# Patient Record
Sex: FEMALE | Race: BLACK OR AFRICAN AMERICAN | NOT HISPANIC OR LATINO | Employment: FULL TIME | URBAN - METROPOLITAN AREA
[De-identification: names, ages, dates, MRNs, and addresses within clinical notes are randomized per-mention and may not be internally consistent; named-entity substitution may affect disease eponyms.]

---

## 2021-08-19 PROBLEM — M54.9 BACK PAIN: Status: ACTIVE | Noted: 2021-08-19

## 2021-08-19 PROBLEM — K59.00 CONSTIPATION: Status: ACTIVE | Noted: 2021-08-19

## 2021-08-19 PROBLEM — M25.559 HIP PAIN: Status: ACTIVE | Noted: 2021-08-19

## 2021-08-19 PROBLEM — R20.2 NUMBNESS AND TINGLING: Status: ACTIVE | Noted: 2021-08-19

## 2021-08-19 PROBLEM — M25.519 SHOULDER PAIN: Status: ACTIVE | Noted: 2021-08-19

## 2021-08-19 PROBLEM — G56.00 CARPAL TUNNEL SYNDROME: Status: ACTIVE | Noted: 2021-08-19

## 2021-08-19 PROBLEM — D64.9 ANEMIA: Status: ACTIVE | Noted: 2021-08-19

## 2021-08-19 PROBLEM — R20.0 NUMBNESS AND TINGLING: Status: ACTIVE | Noted: 2021-08-19

## 2021-08-20 ENCOUNTER — OFFICE VISIT (OUTPATIENT)
Dept: BARIATRICS/WEIGHT MGMT | Facility: CLINIC | Age: 41
End: 2021-08-20

## 2021-08-20 VITALS
DIASTOLIC BLOOD PRESSURE: 98 MMHG | HEIGHT: 63 IN | WEIGHT: 262.4 LBS | HEART RATE: 77 BPM | BODY MASS INDEX: 46.49 KG/M2 | TEMPERATURE: 97.8 F | SYSTOLIC BLOOD PRESSURE: 142 MMHG

## 2021-08-20 DIAGNOSIS — Z98.84 S/P LAPAROSCOPIC SLEEVE GASTRECTOMY: ICD-10-CM

## 2021-08-20 DIAGNOSIS — E66.01 OBESITY, CLASS III, BMI 40-49.9 (MORBID OBESITY) (HCC): Primary | ICD-10-CM

## 2021-08-20 DIAGNOSIS — D50.8 OTHER IRON DEFICIENCY ANEMIA: ICD-10-CM

## 2021-08-20 PROCEDURE — 99203 OFFICE O/P NEW LOW 30 MIN: CPT | Performed by: SURGERY

## 2021-08-20 NOTE — PROGRESS NOTES
[facility] BARIATRIC [facility] BARIATRIC SURGERY  [address] [facility] [address]  [facility] [address]  [phone]  [address] [facility] [address]  [facility] [address]  [phone]  Dept: [phone]  8/20/2021      Sravanthi Kauffman.  [id number]  [id number]  1980  female      Chief Complaint   Patient presents with   • Consult     PORFIRIO SLEEVE / Discuss revision       BH Post-Op Bariatric Surgery:   Sravanthi Kauffman is status post Laparoscopic Sleeve procedure, performed on 2/14/12 at [facility].    HPI:   Today's weight is 119 kg (262 lb 6.4 oz) pounds, today's BMI is Body mass index is 46.2 kg/m².. @ greatest weight loss from surgery was 80 pounds. The patient reports an unwanted weight gain of 20 pounds.  [unfilled] denies fever, chills, chest pain, SOA, melena, hematochezia, hematemesis, dysuria, frequency, hematuria, jaundice.    41-year-old female status post laparoscopic sleeve gastrectomy 2012 in [facility] who moved to [place].  Patient states that she would like to lose more weight and feels like conversion to gastric bypass are possible modified duodenal switch procedure would be best choice.  She went through her daily routine and eating habits.      Diet and Exercise:   Diet history reviewed and discussed with the patient. Weight loss/gains to date discussed with the patient. She reports eating 3 meals per day, a typical portion size of 1 cup, eating 1 snacks per day, drinking 4 or more 8-oz. glasses of water per day, no carbonated beverage consumption and exercising regularly.     Supplements: Iron    Review of Systems   Constitutional: Positive for fatigue.   Gastrointestinal: Positive for constipation.   Musculoskeletal: Positive for arthralgias.   All other systems reviewed and are negative.      Patient Active Problem List   Diagnosis   • Constipation   • Shoulder pain   • Hip pain   • Back pain   • Carpal tunnel syndrome   • Numbness and  tingling   • Anemia   • Obesity, Class III, BMI 40-49.9 (morbid obesity) (CMS/[facility])   • S/P laparoscopic sleeve gastrectomy       History reviewed. No pertinent past medical history.    Past Surgical History:   Procedure Laterality Date   •  SECTION     • GASTRIC SLEEVE LAPAROSCOPIC         Allergies   Allergen Reactions   • Latex Itching       No current outpatient medications on file.    Social History     Socioeconomic History   • Marital status: Single     Spouse name: Not on file   • Number of children: Not on file   • Years of education: Not on file   • Highest education level: Not on file   Tobacco Use   • Smoking status: Never Smoker   • Smokeless tobacco: Never Used   Substance and Sexual Activity   • Alcohol use: Never   • Drug use: Never       Family History   Problem Relation Age of Onset   • Obesity Mother    • Hypertension Mother    • Obesity Father    • Hypertension Father    • Obesity Brother    • Sleep apnea Brother    • Obesity Maternal Grandmother    • Stroke Maternal Grandmother    • Cancer Maternal Grandfather        The following portions of the patient's history were reviewed and updated as appropriate: allergies, current medications, past family history, past medical history, past social history, past surgical history and problem list.    Vitals:    21 1013   BP: 142/98   Pulse: 77   Temp: 97.8 °F (36.6 °C)       Physical Exam  Vitals reviewed.   HENT:      Head: Normocephalic and atraumatic.      Mouth/Throat:      Mouth: Mucous membranes are moist.      Pharynx: Oropharynx is clear.   Eyes:      General: No scleral icterus.     Extraocular Movements: Extraocular movements intact.      Conjunctiva/sclera: Conjunctivae normal.      Pupils: Pupils are equal, round, and reactive to light.   Neck:      Thyroid: No thyromegaly.   Cardiovascular:      Rate and Rhythm: Normal rate.   Pulmonary:      Effort: Pulmonary effort is normal. No respiratory distress.      Breath sounds: Normal  breath sounds. No stridor. No wheezing or rhonchi.   Abdominal:      General: Bowel sounds are normal.      Palpations: Abdomen is soft.      Tenderness: There is no abdominal tenderness. There is no right CVA tenderness, left CVA tenderness, guarding or rebound.      Hernia: No hernia is present.   Musculoskeletal:         General: Normal range of motion.      Cervical back: Normal range of motion and neck supple.   Lymphadenopathy:      Cervical: No cervical adenopathy.   Skin:     General: Skin is warm and dry.      Findings: No erythema.   Neurological:      Mental Status: She is alert and oriented to person, place, and time.   Psychiatric:         Mood and Affect: Mood normal.         Behavior: Behavior normal.         Thought Content: Thought content normal.         Judgment: Judgment normal.           Assessment:   Sravanthi Kauffman has severe obesity with multiple co-morbidities who would like to transfer her bariatric care to us and participate in our bariatric program.      Encounter Diagnoses   Name Primary?   • Obesity, Class III, BMI 40-49.9 (morbid obesity) (CMS/[facility]) Yes   • S/P laparoscopic sleeve gastrectomy    • Other iron deficiency anemia          Discussion/Summary/Plan:     41-year-old female status post laparoscopic sleeve gastrectomy 2012 at [facility] [place] who moved to [place] and is interested in conversion to Divya-en-Y gastric bypass for possible modified duodenal switch.  I went through all the different conversion procedures and all questions answered.  Also discussed clean eating and an exercise routine    Recommended patient be sure to eat at least three meals per day all with high lean protein, vegetables and fruit. Be sure to limit/cut back on daily simple carbohydrate intake. Discussed with the patient the recommended amount of water per day to intake. Reviewed vitamin requirements. Be sure to do routine exercise including both cardio and strength  training. Recommended patient to see our dietician to go into more detail regarding diet changes.    Instructions / Recommendations: dietary counseling recommended, recommended a daily protein intake of  grams, vitamin supplements recommended, recommended exercising at least 150 minutes per week, behavior modifications recommended and instructed to call the office for concerns, questions, or problems.    The patient was instructed to follow up in intake appointment.     The patient was counseled regarding diet, exercise and the surgical procedures available. Our bariatric manual was given to the patient and was discussed in detail. Dietician appointment was highly recommended as well as attending support groups.  Total time of encounter was over 35 minutes counseling the patient and going over the procedure.  Dietary changes as well as exercise were also discussed.  Time was also spent before the encounter to review their history and any documentation provided..

## 2021-09-17 ENCOUNTER — OFFICE VISIT (OUTPATIENT)
Dept: BARIATRICS/WEIGHT MGMT | Facility: CLINIC | Age: 41
End: 2021-09-17

## 2021-09-17 VITALS
RESPIRATION RATE: 18 BRPM | BODY MASS INDEX: 46.6 KG/M2 | WEIGHT: 263 LBS | HEIGHT: 63 IN | HEART RATE: 71 BPM | TEMPERATURE: 97.7 F | DIASTOLIC BLOOD PRESSURE: 94 MMHG | SYSTOLIC BLOOD PRESSURE: 133 MMHG

## 2021-09-17 DIAGNOSIS — D50.8 OTHER IRON DEFICIENCY ANEMIA: ICD-10-CM

## 2021-09-17 DIAGNOSIS — Z98.84 S/P LAPAROSCOPIC SLEEVE GASTRECTOMY: ICD-10-CM

## 2021-09-17 DIAGNOSIS — E66.01 OBESITY, CLASS III, BMI 40-49.9 (MORBID OBESITY) (HCC): Primary | ICD-10-CM

## 2021-09-17 DIAGNOSIS — K59.00 CONSTIPATION, UNSPECIFIED CONSTIPATION TYPE: ICD-10-CM

## 2021-09-17 PROCEDURE — 99214 OFFICE O/P EST MOD 30 MIN: CPT | Performed by: NURSE PRACTITIONER

## 2021-09-17 RX ORDER — FERROUS SULFATE 325(65) MG
325 TABLET ORAL
Qty: 30 TABLET | Refills: 2 | Status: SHIPPED | OUTPATIENT
Start: 2021-09-17 | End: 2021-12-16

## 2021-09-17 RX ORDER — TOPIRAMATE 25 MG/1
25 TABLET ORAL NIGHTLY
Qty: 30 TABLET | Refills: 0 | Status: SHIPPED | OUTPATIENT
Start: 2021-09-17 | End: 2021-10-19

## 2021-09-17 NOTE — PROGRESS NOTES
[facility] BARIATRIC [facility] BARIATRIC SURGERY  [address] [facility] [address]  [facility] [address]  [phone]  [address] [id number] [phone]  Dept: [phone]  9/17/2021      Sravanthi Kauffman.  [id number]  [id number]  1980  female      Chief Complaint   Patient presents with   • Consult     diet 2/3       The patient is here for month 2/3 of their pre-operative physician supervised diet. Today's weight is 119 kg (263 lb) pounds and had a loss of 1 lbs. The patient states that she is following the recommendations given by our office and dietician including a high lean protein, low carb and low fat diet. We recommended adequate fruits and vegetable intake along with limited portion sizes. Patient is working on eliminating fast foods, fried foods, sweets and soda. Sravanthi Kauffman has been increasing her daily water intake. She has been exercising: she has a very active job at [employer]. She has been struggling with back and knee pain. She used to walk 7 miles per day but hasn't been able to do so due to her back and knee pain.    Patient states they have made positive changes including getting 1 meal per day. She works at [employer] and has a pretty busy schedule. She doesn't usually eat at work. She works 8-10 hours. She usually wakes up by lunch time and eats then, she will have a snack in the evenings. When she wakes up she may do salad with grilled chicken or may do fast foods at time. She may grab an apple be before bed. She will usually have leftovers in the evening before bed.   The patient admits to be struggling with knee pain, back pain, sleep disturbance, and getting used to shift work.     Review of Systems   Constitutional: Positive for appetite change. Negative for fatigue and unexpected weight change.   HENT: Negative.    Eyes: Negative.    Respiratory: Negative.    Cardiovascular: Negative.  Negative for leg swelling.   Gastrointestinal: Negative for  abdominal distention, abdominal pain, constipation, diarrhea, nausea and vomiting.   Genitourinary: Negative for difficulty urinating, frequency and urgency.   Musculoskeletal: Negative for back pain.   Skin: Negative.    Psychiatric/Behavioral: Positive for sleep disturbance.   All other systems reviewed and are negative.    Vitals:    09/17/21 0829   BP: 133/94   Pulse: 71   Resp: 18   Temp: 97.7 °F (36.5 °C)     Patient Active Problem List   Diagnosis   • Constipation   • Shoulder pain   • Hip pain   • Back pain   • Carpal tunnel syndrome   • Numbness and tingling   • Anemia   • Obesity, Class III, BMI 40-49.9 (morbid obesity) (CMS/[facility])   • S/P laparoscopic sleeve gastrectomy     Body mass index is 46.31 kg/m².    The following portions of the patient's history were reviewed and updated as appropriate: active problem list, medication list, family history, health maintenance, notes from last encounter    Physical Exam  Vitals and nursing note reviewed.   Constitutional:       Appearance: She is well-developed.   Neck:      Thyroid: No thyromegaly.   Cardiovascular:      Rate and Rhythm: Normal rate and regular rhythm.      Heart sounds: Normal heart sounds.   Pulmonary:      Effort: Pulmonary effort is normal. No respiratory distress.      Breath sounds: Normal breath sounds. No wheezing.   Abdominal:      General: Bowel sounds are normal. There is no distension.      Palpations: Abdomen is soft.      Tenderness: There is no abdominal tenderness. There is no guarding.      Hernia: No hernia is present.   Musculoskeletal:         General: No tenderness.   Skin:     General: Skin is warm and dry.      Findings: No erythema or rash.   Neurological:      Mental Status: She is alert.   Psychiatric:         Behavior: Behavior normal.         Discussion/Plan:  Patient was advised to keep pushing fluids when she is working in the heat and to pack a freezer pack with easy high protein snacks for her break at work. Due to  the fact that she prefers to drink rather than eat, I would think the RNY may be a better option for revision but we will discuss this more in depth at her intake apt. She does report a hard time getting to sleep. We discussed start low dose topirimate at 25mg in the evenings to help with cravings and sleep quality.   Obesity/Morbid Obesity: Currently the patient's weight is stable. There are no medications prescribed.Treatment plan includes prescribed diet, prescribed exercise regimen and behavior modification.    I reviewed the appropriate dietary choices with the patient and encouraged the necessary changes. Recommended at least 70 grams of protein per day, around 35 grams of fats and less than 100 grams of carbohydrates. Reviewed calorie intake if patient wanted to calorie count and/or had BMR. Instructed patient to drink half of body weight in ounces per day and exercise a minimum of 150 minutes per week including both cardio and strength training. Discussed the option of keeping a food journal which will help patient become more aware of the nutritional value of foods so they are more prepared after surgery.    The patient was given written materials from our office for education.   I answered all of the patients questions regarding dietary changes, exercise or surgical options.  The patient will follow up in 1 month. The total time spent during this encounter was 35 minutes    Encounter Diagnoses   Name Primary?   • Obesity, Class III, BMI 40-49.9 (morbid obesity) (CMS/[facility]) Yes   • S/P laparoscopic sleeve gastrectomy    • Other iron deficiency anemia

## 2021-09-20 RX ORDER — TOPIRAMATE 25 MG/1
25 TABLET ORAL NIGHTLY
Qty: 30 TABLET | Refills: 0 | OUTPATIENT
Start: 2021-09-20

## 2021-10-19 RX ORDER — TOPIRAMATE 25 MG/1
25 TABLET ORAL NIGHTLY
Qty: 30 TABLET | Refills: 0 | Status: SHIPPED | OUTPATIENT
Start: 2021-10-19 | End: 2021-11-29 | Stop reason: SDUPTHER

## 2021-11-19 RX ORDER — TOPIRAMATE 25 MG/1
25 TABLET ORAL NIGHTLY
Qty: 30 TABLET | Refills: 0 | Status: CANCELLED | OUTPATIENT
Start: 2021-11-19

## 2021-11-29 RX ORDER — TOPIRAMATE 25 MG/1
25 TABLET ORAL NIGHTLY
Qty: 90 TABLET | Refills: 0 | Status: SHIPPED | OUTPATIENT
Start: 2021-11-29 | End: 2022-02-21

## 2022-02-21 RX ORDER — TOPIRAMATE 25 MG/1
25 TABLET ORAL NIGHTLY
Qty: 90 TABLET | Refills: 0 | Status: SHIPPED | OUTPATIENT
Start: 2022-02-21 | End: 2022-08-03

## 2022-04-11 ENCOUNTER — CONSULT (OUTPATIENT)
Dept: BARIATRICS/WEIGHT MGMT | Facility: CLINIC | Age: 42
End: 2022-04-11

## 2022-04-11 VITALS
DIASTOLIC BLOOD PRESSURE: 87 MMHG | TEMPERATURE: 97.7 F | BODY MASS INDEX: 45.18 KG/M2 | WEIGHT: 255 LBS | HEIGHT: 63 IN | HEART RATE: 69 BPM | RESPIRATION RATE: 18 BRPM | SYSTOLIC BLOOD PRESSURE: 124 MMHG

## 2022-04-11 DIAGNOSIS — E66.01 OBESITY, CLASS III, BMI 40-49.9 (MORBID OBESITY): Primary | ICD-10-CM

## 2022-04-11 DIAGNOSIS — K59.00 CONSTIPATION, UNSPECIFIED CONSTIPATION TYPE: ICD-10-CM

## 2022-04-11 DIAGNOSIS — Z98.84 S/P LAPAROSCOPIC SLEEVE GASTRECTOMY: ICD-10-CM

## 2022-04-11 DIAGNOSIS — R20.0 NUMBNESS AND TINGLING: ICD-10-CM

## 2022-04-11 DIAGNOSIS — M54.9 CHRONIC BACK PAIN, UNSPECIFIED BACK LOCATION, UNSPECIFIED BACK PAIN LATERALITY: ICD-10-CM

## 2022-04-11 DIAGNOSIS — D50.8 OTHER IRON DEFICIENCY ANEMIA: ICD-10-CM

## 2022-04-11 DIAGNOSIS — G89.29 CHRONIC BACK PAIN, UNSPECIFIED BACK LOCATION, UNSPECIFIED BACK PAIN LATERALITY: ICD-10-CM

## 2022-04-11 DIAGNOSIS — R20.2 NUMBNESS AND TINGLING: ICD-10-CM

## 2022-04-11 PROCEDURE — 99215 OFFICE O/P EST HI 40 MIN: CPT | Performed by: NURSE PRACTITIONER

## 2022-04-11 RX ORDER — FERROUS SULFATE 325(65) MG
325 TABLET ORAL
Qty: 30 TABLET | Refills: 2 | Status: SHIPPED | OUTPATIENT
Start: 2022-04-11 | End: 2022-07-10

## 2022-04-11 NOTE — PROGRESS NOTES
[facility] BARIATRIC [facility] BARIATRIC SURGERY  [address] [id number] [phone]  [address] [address] [phone]  Dept: [phone]  2022      Sravanthi Kauffman.  [id number]  [id number]  1980  female      Chief Complaint of weight gain; unable to maintain weight loss    History of Present Illness:   Sravanthi is a 42 y.o. female who presents today for evaluation, education and consultation regarding weight loss surgery. The patient is interested in conversion to RNY gastric bypass.    Diet History:Sravanthi has been overweight for at least 12 years, has been 35 pounds or more overweight for at least 12 years, has been 100 pounds or more overweight for 8 or more years and started dieting at age 19.  The most weight Sravanthi lost was 40 pounds after undergoing sleeve gastrectomy and maintained the weight loss for 9 months. Sravanthi describes her eating habits as snacking occasionally but still getting three meals per day and leading with protein. Sravanthi Kauffman has tried Atkins, Weight Watchers, Fasting, Physician monitored, reduced calorie, exercising, previous weight loss surgery and intermittent fasting among others with success of losing up to 40 pounds, but in each instance regained the weight.     See dietician documentation for complete history.    Bariatric Surgery Evaluation: The patient is being seen for an initial visit for bariatric surgery evaluation and education.     Bariatric Co-morbidities:  back pain    Patient Active Problem List   Diagnosis   • Constipation   • Shoulder pain   • Hip pain   • Back pain   • Carpal tunnel syndrome   • Anemia   • Obesity, Class III, BMI 40-49.9 (morbid obesity) (HCC)   • S/P laparoscopic sleeve gastrectomy       No past medical history on file.    Past Surgical History:   Procedure Laterality Date   •  SECTION     • GASTRIC SLEEVE LAPAROSCOPIC         Allergies    Allergen Reactions   • Latex Itching         Current Outpatient Medications:   •  topiramate (TOPAMAX) 25 MG tablet, TAKE 1 TABLET BY MOUTH EVERY NIGHT, Disp: 90 tablet, Rfl: 0  •  ferrous sulfate 325 (65 FE) MG tablet, Take 1 tablet by mouth Daily With Breakfast for 90 days. Can take with food to help prevent nausea., Disp: 30 tablet, Rfl: 2    Social History     Socioeconomic History   • Marital status: Single   Tobacco Use   • Smoking status: Never Smoker   • Smokeless tobacco: Never Used   Vaping Use   • Vaping Use: Never used   Substance and Sexual Activity   • Alcohol use: Never   • Drug use: Never   • Sexual activity: Defer       Family History   Problem Relation Age of Onset   • Obesity Mother    • Hypertension Mother    • Obesity Father    • Hypertension Father    • Obesity Brother    • Sleep apnea Brother    • Obesity Maternal Grandmother    • Stroke Maternal Grandmother    • Cancer Maternal Grandfather          Review of Systems:  Review of Systems   Constitutional: Positive for fatigue.   HENT: Negative.    Respiratory: Negative.    Cardiovascular: Negative.    Gastrointestinal: Negative.    Endocrine: Negative.    Genitourinary: Negative.    Musculoskeletal: Positive for back pain.   Skin: Negative.    Neurological: Negative.    Psychiatric/Behavioral: Negative.        Physical Exam:  Vital Signs:  Weight: 116 kg (255 lb)   Body mass index is 44.85 kg/m².  Temp: 97.7 °F (36.5 °C)   Heart Rate: 69   BP: 124/87     Physical Exam  Vitals and nursing note reviewed.   Constitutional:       Appearance: She is well-developed.   HENT:      Head: Normocephalic and atraumatic.   Cardiovascular:      Rate and Rhythm: Normal rate and regular rhythm.      Heart sounds: Normal heart sounds.   Pulmonary:      Effort: Pulmonary effort is normal. No respiratory distress.      Breath sounds: Normal breath sounds. No wheezing.   Abdominal:      General: Bowel sounds are normal. There is no distension.      Palpations:  Abdomen is soft.      Tenderness: There is no abdominal tenderness.   Musculoskeletal:         General: No deformity.      Cervical back: Normal range of motion.   Skin:     General: Skin is warm and dry.   Neurological:      Mental Status: She is alert and oriented to person, place, and time.   Psychiatric:         Behavior: Behavior normal.            Assessment:         Sravanthi Kauffman is a 42 y.o. year old female with medically complicated severe obesity. Weight: 116 kg (255 lb), Body mass index is 44.85 kg/m². and weight related problems including back pain.    I explained in detail, potential surgical options of interest to the patient including the RNY gastric bypass, sleeve gastrectomy, and gastric band while considering the patient's medical history. At this time, the patient expressed interest in the Laparoscopic RNY Bypass  All of those procedures can be performed laparoscopically but there is a chance to convert to open if any technical challenges or complications do occur.  Bariatric surgery is not cosmetic surgery but rather a tool to help a patient make a life-long commitment lifestyle changes including diet, exercise, behavior changes, and taking supplemental vitamins and minerals.    Due to the patient's BMI, history, and co-morbidities related to potential surgical complications were evaluated. Due to Sravanthi Kauffman's risk factors female will obtain the following prior to being scheduled for surgical intervention:    A letter of medical support as well as a history and physical must be obtained from her primary care provider. The patient was given a copy of a sample form, that will suffice as their letter to take to their primary are provider.    A referral for pre-operative psychological evaluation was ordered for the patient to evaluate candidacy as well as provide mental health support, should it be warranted before or after surgery.     EKG, CXR, EGD with biopsy, psychology clearance, CBC,  CMP, Hba1c and TSHwere ordered at this time. These will be drawn and patient will be notified with results. Patient will complete new, pre-operative radiology prior to being scheduled for surgery.     Sravanthi Kauffman was screened for sleep apnea in our office today and based on their results she is low risk for SHER. The risks, as they relate to chronic hypercapnia r/t untreated SHER were discussed with the patient and She verbalized understanding.     A pre-operative diagnostic esophagogastroduodenoscopy with biopsy for evaluation will be ordered and scheduled for this patient. The risks and benefits of the procedure were discussed with the patient in detail and all questions were answered.  Possibility of perforation, bleeding, aspiration, anoxic brain injury, respiratory and/or cardiac arrest and death were discussed.   She received handouts regarding, all questions were answered.     Sravanthi Kauffman verbalized understanding related to COVID-19 pre-procedure testing policies and has consented to a preoperative test 48-72 hours before She's scheduled EGD and bariatric surgical procedure.     The risks, benefits, alternatives, and potential complications of all of the sleeve gastrectomy explained in detail including, but not limited to death, anesthesia and medication adverse effect/DVT, pulmonary embolism, trocar site/incisional hernia, wound infection, abdominal infection, bleeding, failure to lose weight or gain weight and change in body image, metabolic complications with calcium, thiamine, vitamin B12, folate, iron, and anemia.    The patient was advised to start a high protein, low fat and low carbohydrate diet  start routine exercise including but not limited to 150 minutes per week. The patient received a resistance band along with a handout of exercises. The patient was given individualized information by our dietician along with handouts.     The patient was given information regarding the TOYA  educational video. TOYA is an internet based educational video which explains the sourgical procedure and answers basic questions regarding the procedure. The patient was provided with instructions and a password to watch the video.    The patient understands the surgical procedures and the different surgical options that are available.  She understands the lifestyle changes that would be required after surgery and has agreed to participate in a pre-operative and postoperative weight management program.  She also expressed understanding of possible risks, had several questions answered and desires to proceed.    I think she is a good candidate for this surgery, and is interested in a gastric bypass.    Encounter Diagnoses   Name Primary?   • Obesity, Class III, BMI 40-49.9 (morbid obesity) ([facility]) Yes   • S/P laparoscopic sleeve gastrectomy    • Chronic back pain, unspecified back location, unspecified back pain laterality    • Numbness and tingling    • Other iron deficiency anemia    • Constipation, unspecified constipation type        Plan:    Patient will be evaluated by a bariatric dietician psychologist before undergoing a multidisciplinary review of her candidacy. We discussed weight loss requirements prior to surgery and rationale, as well as other program requirements to ensure the safest approach to surgery. We spent time discussing different surgical procedures and plan of care throughout their lifespan to ensure long term success in achieving and maintaining a healthier weight. Patient will proceed with preoperative lab work, radiology, and endoscopy after obtaining agreed upon specialty, clearances, sleep study, and letter of support from her primary care provider.    Total time spent during this encounter today was 50 minutes and includes preparing for the visit, reviewing tests, performing a medically appropriate examination and/or evaluations, counseling and educating the patient/family/caregiver,  ordering medications, tests, or procedures, documenting information in the medical record and independently interpreting results and communicating that information with the patient/family/caregiver  Brenda Peralta, TARIK  4/11/2022

## 2022-04-12 PROBLEM — R20.2 NUMBNESS AND TINGLING: Status: ACTIVE | Noted: 2022-04-12

## 2022-04-12 PROBLEM — R20.0 NUMBNESS AND TINGLING: Status: ACTIVE | Noted: 2022-04-12

## 2022-04-14 ENCOUNTER — APPOINTMENT (OUTPATIENT)
Dept: BARIATRICS/WEIGHT MGMT | Facility: CLINIC | Age: 42
End: 2022-04-14

## 2022-04-18 ENCOUNTER — OFFICE VISIT (OUTPATIENT)
Dept: BARIATRICS/WEIGHT MGMT | Facility: CLINIC | Age: 42
End: 2022-04-18

## 2022-04-18 DIAGNOSIS — Z71.89 PRE-BARIATRIC SURGERY PSYCHOLOGICAL EVALUATION: Primary | ICD-10-CM

## 2022-04-18 NOTE — PROGRESS NOTES
MENTAL HEALTH NOTE          MENTAL HEALTH NOTE: Pt is a 41yo female being seen today for a mental health evaluation for weight loss surgery. Pt completed the Agarwal anxiety inventory screener and her score was normal. Pt completed the Agarwal depression inventory and pt score indicated normal mood. Pt also completed the WHOQOL-BREF quality of life screener and the screener indicated that pt was satisfied with her life overall. Pt stated she has struggled with her weight most of her life and her weight has always fluctuated. Pt stated she has tried exercising, dieting, and weight loss surgery. Pt is considering a sleeve to gastric bypass revision surgery.  Pt stated her goal weight is 160-170lbs. Pt works full-time on 3rd shift. Pt lives alone, but stated she has a decent support system that includes her son and brother. Pt denied substance abuse, suicide attempts, smoking and psychiatric diagnoses. Pt stated she does drink socially. Pt stated she struggles with sleep, but stated that may be due to her work schedule. Pt stated she did not have any past abuse or traumas, and denied eating disorder diagnoses. SW educated pt on what to do if her depression increases after her surgery. Pt is aware of the lifestyle changes she will need to make before and after getting the surgery. Pt demonstrates ability to follow the plan and expresses willingness to comply.           Mental Status Exam: Within normal limits unless noted otherwise    Appearance:    Attitude:    Behavior/Motor Activity:    Affect:    Mood:    Speech:    Thought Content:    Thought Process:    Orientation:    Memory:    Insight:    Judgment:    Suicidal Ideation:denied    Homicidal Ideation:denied        See patient chart for co-morbid conditions.      Recommendation Notes:Based on the above, as well as the full bariatric surgery program offered by [facility], it is my opinion that this candidate, psychologically: is a suitable candidate for bariatric  surgery.       Signed: Roxy Naranjo, MSW,  LCSW

## 2022-04-22 ENCOUNTER — HOSPITAL ENCOUNTER (OUTPATIENT)
Dept: GENERAL RADIOLOGY | Facility: HOSPITAL | Age: 42
Discharge: HOME | End: 2022-04-22

## 2022-04-22 ENCOUNTER — TRANSCRIBE ORDERS (OUTPATIENT)
Dept: CARDIOLOGY | Facility: HOSPITAL | Age: 42
End: 2022-04-22

## 2022-04-22 ENCOUNTER — HOSPITAL ENCOUNTER (OUTPATIENT)
Dept: CARDIOLOGY | Facility: HOSPITAL | Age: 42
Discharge: HOME | End: 2022-04-22

## 2022-04-22 ENCOUNTER — LAB (OUTPATIENT)
Dept: LAB | Facility: HOSPITAL | Age: 42
End: 2022-04-22

## 2022-04-22 DIAGNOSIS — G89.29 CHRONIC BACK PAIN, UNSPECIFIED BACK LOCATION, UNSPECIFIED BACK PAIN LATERALITY: ICD-10-CM

## 2022-04-22 DIAGNOSIS — K59.00 CONSTIPATION, UNSPECIFIED CONSTIPATION TYPE: ICD-10-CM

## 2022-04-22 DIAGNOSIS — Z98.84 S/P LAPAROSCOPIC SLEEVE GASTRECTOMY: ICD-10-CM

## 2022-04-22 DIAGNOSIS — M54.9 CHRONIC BACK PAIN, UNSPECIFIED BACK LOCATION, UNSPECIFIED BACK PAIN LATERALITY: ICD-10-CM

## 2022-04-22 DIAGNOSIS — R20.2 NUMBNESS AND TINGLING: ICD-10-CM

## 2022-04-22 DIAGNOSIS — Z01.811 PRE-OP CHEST EXAM: Primary | ICD-10-CM

## 2022-04-22 DIAGNOSIS — E66.01 OBESITY, CLASS III, BMI 40-49.9 (MORBID OBESITY): ICD-10-CM

## 2022-04-22 DIAGNOSIS — D50.8 OTHER IRON DEFICIENCY ANEMIA: ICD-10-CM

## 2022-04-22 DIAGNOSIS — R20.0 NUMBNESS AND TINGLING: ICD-10-CM

## 2022-04-22 LAB
ALBUMIN SERPL-MCNC: 4.1 G/DL (ref 3.5–5.2)
ALBUMIN/GLOB SERPL: 1.2 G/DL
ALP SERPL-CCNC: 81 U/L (ref 39–117)
ALT SERPL W P-5'-P-CCNC: 23 U/L (ref 1–33)
ANION GAP SERPL CALCULATED.3IONS-SCNC: 7.7 MMOL/L (ref 5–15)
AST SERPL-CCNC: 23 U/L (ref 1–32)
BASOPHILS # BLD AUTO: 0.01 10*3/MM3 (ref 0–0.2)
BASOPHILS NFR BLD AUTO: 0.2 % (ref 0–1.5)
BILIRUB SERPL-MCNC: 0.2 MG/DL (ref 0–1.2)
BUN SERPL-MCNC: 17 MG/DL (ref 6–20)
BUN/CREAT SERPL: 22.1 (ref 7–25)
CALCIUM SPEC-SCNC: 8.8 MG/DL (ref 8.6–10.5)
CHLORIDE SERPL-SCNC: 109 MMOL/L (ref 98–107)
CO2 SERPL-SCNC: 24.3 MMOL/L (ref 22–29)
CREAT SERPL-MCNC: 0.77 MG/DL (ref 0.57–1)
DEPRECATED RDW RBC AUTO: 47.5 FL (ref 37–54)
EGFRCR SERPLBLD CKD-EPI 2021: 98.3 ML/MIN/1.73
EOSINOPHIL # BLD AUTO: 0.14 10*3/MM3 (ref 0–0.4)
EOSINOPHIL NFR BLD AUTO: 2.7 % (ref 0.3–6.2)
ERYTHROCYTE [DISTWIDTH] IN BLOOD BY AUTOMATED COUNT: 13.9 % (ref 12.3–15.4)
GLOBULIN UR ELPH-MCNC: 3.3 GM/DL
GLUCOSE SERPL-MCNC: 87 MG/DL (ref 65–99)
HBA1C MFR BLD: 5.1 % (ref 4.8–5.6)
HCT VFR BLD AUTO: 35.3 % (ref 34–46.6)
HGB BLD-MCNC: 11.2 G/DL (ref 12–15.9)
IMM GRANULOCYTES # BLD AUTO: 0.01 10*3/MM3 (ref 0–0.05)
IMM GRANULOCYTES NFR BLD AUTO: 0.2 % (ref 0–0.5)
LYMPHOCYTES # BLD AUTO: 1.62 10*3/MM3 (ref 0.7–3.1)
LYMPHOCYTES NFR BLD AUTO: 30.7 % (ref 19.6–45.3)
MCH RBC QN AUTO: 29.6 PG (ref 26.6–33)
MCHC RBC AUTO-ENTMCNC: 31.7 G/DL (ref 31.5–35.7)
MCV RBC AUTO: 93.1 FL (ref 79–97)
MONOCYTES # BLD AUTO: 0.51 10*3/MM3 (ref 0.1–0.9)
MONOCYTES NFR BLD AUTO: 9.7 % (ref 5–12)
NEUTROPHILS NFR BLD AUTO: 2.98 10*3/MM3 (ref 1.7–7)
NEUTROPHILS NFR BLD AUTO: 56.5 % (ref 42.7–76)
NRBC BLD AUTO-RTO: 0 /100 WBC (ref 0–0.2)
PLATELET # BLD AUTO: 221 10*3/MM3 (ref 140–450)
PMV BLD AUTO: 11.2 FL (ref 6–12)
POTASSIUM SERPL-SCNC: 4.2 MMOL/L (ref 3.5–5.2)
PROT SERPL-MCNC: 7.4 G/DL (ref 6–8.5)
QT INTERVAL: 374 MS
RBC # BLD AUTO: 3.79 10*6/MM3 (ref 3.77–5.28)
SODIUM SERPL-SCNC: 141 MMOL/L (ref 136–145)
TSH SERPL DL<=0.05 MIU/L-ACNC: 1.47 UIU/ML (ref 0.27–4.2)
WBC NRBC COR # BLD: 5.27 10*3/MM3 (ref 3.4–10.8)

## 2022-04-22 PROCEDURE — 36415 COLL VENOUS BLD VENIPUNCTURE: CPT

## 2022-04-22 PROCEDURE — 71046 X-RAY EXAM CHEST 2 VIEWS: CPT

## 2022-04-22 PROCEDURE — 80050 GENERAL HEALTH PANEL: CPT

## 2022-04-22 PROCEDURE — 93010 ELECTROCARDIOGRAM REPORT: CPT | Performed by: INTERNAL MEDICINE

## 2022-04-22 PROCEDURE — 83036 HEMOGLOBIN GLYCOSYLATED A1C: CPT

## 2022-04-22 PROCEDURE — 93005 ELECTROCARDIOGRAM TRACING: CPT

## 2022-04-25 ENCOUNTER — OFFICE VISIT (OUTPATIENT)
Dept: FAMILY MEDICINE CLINIC | Facility: CLINIC | Age: 42
End: 2022-04-25

## 2022-04-25 VITALS
HEART RATE: 71 BPM | OXYGEN SATURATION: 99 % | TEMPERATURE: 98 F | DIASTOLIC BLOOD PRESSURE: 84 MMHG | BODY MASS INDEX: 46.42 KG/M2 | WEIGHT: 262 LBS | HEIGHT: 63 IN | SYSTOLIC BLOOD PRESSURE: 120 MMHG

## 2022-04-25 DIAGNOSIS — M54.31 RIGHT SIDED SCIATICA: Primary | ICD-10-CM

## 2022-04-25 DIAGNOSIS — M54.18 RIGHT SACRAL RADICULOPATHY: ICD-10-CM

## 2022-04-25 DIAGNOSIS — D50.8 OTHER IRON DEFICIENCY ANEMIA: ICD-10-CM

## 2022-04-25 DIAGNOSIS — E66.01 OBESITY, CLASS III, BMI 40-49.9 (MORBID OBESITY): ICD-10-CM

## 2022-04-25 PROCEDURE — 99214 OFFICE O/P EST MOD 30 MIN: CPT | Performed by: NURSE PRACTITIONER

## 2022-04-25 RX ORDER — MELOXICAM 15 MG/1
15 TABLET ORAL DAILY PRN
Qty: 21 TABLET | Refills: 0 | Status: SHIPPED | OUTPATIENT
Start: 2022-04-29 | End: 2022-05-12

## 2022-04-25 RX ORDER — METHYLPREDNISOLONE 4 MG/1
TABLET ORAL
Qty: 21 EACH | Refills: 0 | Status: SHIPPED | OUTPATIENT
Start: 2022-04-25 | End: 2022-05-09

## 2022-04-25 NOTE — ASSESSMENT & PLAN NOTE
Patient is currently following with bariatric surgery.  Patient plans to have a Laparoscopic RNY Bypass per bariatric notes. Patient will continue to follow with this specialty as advised.  Patient has already had a visit with a mental health care provider and has had baseline lab work performed.

## 2022-04-25 NOTE — PROGRESS NOTES
"Chief Complaint  Establish Care (New pt - nonfasting - Had labs 4/22/22. Needs bariatric surgery form completed for gastric bypass revision. Sees bariatric MD ) and Back Pain (R sciatic pain that \"pulls down into calf\" ~3m. Worse with movement or if sitting for extended periods of time. )    Masks/face shield/appropriate PPE were worn for the entirety of the visit by the patient, MA, and provider.     Subjective          Sravanthi Kauffman presents to [facility]  History of Present Illness  Sravanthi Kauffman is a 42 y.o. female who presents to the clinic today to establish care.  Patient has a medical history of anemia and arthritis.  She has concerns today regarding back pain.  She is also following with bariatric surgery and plans to have a gastric bypass revision.    Medical history:  · Anemia: Patient states she has had anemia since high school, even before her gastric sleeve procedure in 2012.  She also has a family history of anemia.  She denies blood in her stool or blood in her urine.  She is on iron supplements, but this causes constipation.  Patient states she has been prescribed a new iron supplement by the bariatric doctor and she plans to start this today.  She thinks she used to have a prescription for vitamin B12.  She states her periods are not heavy and last about 4 days.   · Arthritis:  Patient states arthritis started in her left knee, but she is concerned she may be developing arthritis in her right knee.  She states she has never seen a specialist, but was diagnosed with this from her previous PCP in [place].  She has never been on medication for her arthritis.    · History of gastric sleeve: Patient had this performed in [place] in 2012.  Her weight changed from 230 pounds to 256 after procedure.  Since then, she has been doing Weight Watchers and exercising and her weight has steadily been increasing.  Patient is planning to have a gastric sleeve " "resection and has seen the bariatric provider here in [place].  Patient is currently on topiramate to help suppress the appetite.    Complaints today:  · Right-sided back pain: Patient has had this for about 3 to 4 months.  She feels like it is a nerve problem.  Sitting makes her pain worse.  She states nothing seems to improve her pain.  She has been taking Tylenol, which has not been helping.  She does not remember a specific injury.  She does remember doing homework on her couch and leaning forward for an extended period of time that may have triggered the pain.  Lying in certain positions in the bed make her pain worse.  She does feel as if the pain starts in her right low back and radiates down the back of her right leg.  She denies lower extremity weakness or incontinence.  She describes the pain as burning.    Of note, patient's mother had cancer.  Patient is unsure of the official cancer diagnosis, but states her mom had tumors in her stomach.      Review of Systems   Constitutional: Negative.    HENT: Negative.    Eyes: Negative.    Respiratory: Negative.    Cardiovascular: Negative.    Gastrointestinal: Negative.    Endocrine: Negative.    Genitourinary: Negative.    Musculoskeletal: Positive for arthralgias and back pain.   Skin: Negative.    Allergic/Immunologic: Negative.    Neurological: Negative.    Hematological: Negative.    Psychiatric/Behavioral: Negative.        Objective   Vital Signs:   /84   Pulse 71   Temp 98 °F (36.7 °C)   Ht 160.5 cm (63.19\")   Wt 119 kg (262 lb)   SpO2 99%   BMI 46.13 kg/m²            Physical Exam  Vitals reviewed.   Constitutional:       General: She is not in acute distress.     Appearance: Normal appearance. She is obese. She is not ill-appearing, toxic-appearing or diaphoretic.   HENT:      Head: Normocephalic and atraumatic.   Eyes:      General: No scleral icterus.        Right eye: No discharge.         Left eye: No discharge.      Extraocular " Movements: Extraocular movements intact.   Cardiovascular:      Rate and Rhythm: Normal rate and regular rhythm.      Heart sounds: Normal heart sounds.   Pulmonary:      Effort: No respiratory distress.      Breath sounds: Normal breath sounds. No stridor. No wheezing or rhonchi.   Musculoskeletal:      Lumbar back: No swelling, edema, deformity, signs of trauma, lacerations, spasms, tenderness or bony tenderness. Normal range of motion. Negative right straight leg raise test and negative left straight leg raise test. No scoliosis.      Right lower leg: No deformity. No edema.      Left lower leg: No deformity. No edema.        Legs:       Comments: Locations of tenderness on palpation as circled above.    Neurological:      General: No focal deficit present.      Mental Status: She is alert and oriented to person, place, and time.      Cranial Nerves: No cranial nerve deficit.      Motor: No weakness.   Psychiatric:         Mood and Affect: Mood normal.         Behavior: Behavior normal.        Result Review :                 Assessment and Plan    Diagnoses and all orders for this visit:    1. Right sided sciatica (Primary)  -     methylPREDNISolone (MEDROL) 4 MG dose pack; Take as directed on package instructions.  Dispense: 21 each; Refill: 0  -     meloxicam (Mobic) 15 MG tablet; Take 1 tablet by mouth Daily As Needed for Moderate Pain .  Dispense: 21 tablet; Refill: 0  -     Cancel: Ambulatory Referral to Physical Therapy Evaluate and treat  -     Ambulatory Referral to Physical Therapy Evaluate and treat    2. Right sacral radiculopathy  -     methylPREDNISolone (MEDROL) 4 MG dose pack; Take as directed on package instructions.  Dispense: 21 each; Refill: 0  -     meloxicam (Mobic) 15 MG tablet; Take 1 tablet by mouth Daily As Needed for Moderate Pain .  Dispense: 21 tablet; Refill: 0  -     Ambulatory Referral to Physical Therapy Evaluate and treat    3. Obesity, Class III, BMI 40-49.9 (morbid obesity)  ([facility])  Assessment & Plan:  Patient is currently following with bariatric surgery.  Patient plans to have a Laparoscopic RNY Bypass per bariatric notes. Patient will continue to follow with this specialty as advised.  Patient has already had a visit with a mental health care provider and has had baseline lab work performed.      4. Other iron deficiency anemia    1. Back pain: Patient's pain appears radicular in nature.  Patient has been prescribed a Medrol Dosepak.  She was advised to take this in the morning with food.  Patient was advised that she can take meloxicam as needed after she finishes the Medrol Dosepak.  I did advise that we refer her to physical therapy for evaluation and treatment.  This order has been placed today.  2. Obesity: patient is undergoing evaluation by bariatric surgery.     Follow Up   Return in about 6 weeks (around 6/6/2022) for Annual physical with pap smear- early with fasting.  Patient was given instructions and counseling regarding her condition or for health maintenance advice. Please see specific information pulled into the AVS if appropriate.     Electronically signed by TARIK Reinoso, 04/30/22, 6:17 PM EDT.

## 2022-04-26 ENCOUNTER — TELEPHONE (OUTPATIENT)
Dept: BARIATRICS/WEIGHT MGMT | Facility: CLINIC | Age: 42
End: 2022-04-26

## 2022-04-26 DIAGNOSIS — R94.31 ABNORMAL EKG: ICD-10-CM

## 2022-04-26 DIAGNOSIS — E66.01 OBESITY, CLASS III, BMI 40-49.9 (MORBID OBESITY): Primary | ICD-10-CM

## 2022-04-26 NOTE — TELEPHONE ENCOUNTER
----- Message from TARIK Castillo sent at 4/25/2022  4:35 PM EDT -----  We should get cardiac clearance to see if they would suggest beta blockers due to bigeminy. If she is willing, pleae let me know and I will order.

## 2022-05-04 ENCOUNTER — PATIENT ROUNDING (BHMG ONLY) (OUTPATIENT)
Dept: FAMILY MEDICINE CLINIC | Facility: CLINIC | Age: 42
End: 2022-05-04

## 2022-05-04 NOTE — PROGRESS NOTES
May 4, 2022    Hello, may I speak with Sravanthi Kauffman?    My name is Blanka      [facility]  [address] [place] [id number] [phone].    Before we get started may I verify your date of birth? 1980    I am calling to officially welcome you to our practice and ask about your recent visit. Is this a good time to talk? Left message welcoming patient to our office to see how their visit went and if they had any questions or concerns to feel free to reach out to our office

## 2022-05-09 ENCOUNTER — OFFICE VISIT (OUTPATIENT)
Dept: CARDIOLOGY | Facility: CLINIC | Age: 42
End: 2022-05-09

## 2022-05-09 VITALS
DIASTOLIC BLOOD PRESSURE: 84 MMHG | SYSTOLIC BLOOD PRESSURE: 128 MMHG | HEART RATE: 72 BPM | HEIGHT: 63 IN | BODY MASS INDEX: 45.43 KG/M2 | WEIGHT: 256.4 LBS

## 2022-05-09 DIAGNOSIS — I49.1 APC (ATRIAL PREMATURE CONTRACTIONS): ICD-10-CM

## 2022-05-09 DIAGNOSIS — R06.09 DOE (DYSPNEA ON EXERTION): ICD-10-CM

## 2022-05-09 DIAGNOSIS — E66.01 OBESITY, CLASS III, BMI 40-49.9 (MORBID OBESITY): ICD-10-CM

## 2022-05-09 DIAGNOSIS — Z01.810 PREOP CARDIOVASCULAR EXAM: ICD-10-CM

## 2022-05-09 PROCEDURE — 99204 OFFICE O/P NEW MOD 45 MIN: CPT | Performed by: INTERNAL MEDICINE

## 2022-05-09 PROCEDURE — 93000 ELECTROCARDIOGRAM COMPLETE: CPT | Performed by: INTERNAL MEDICINE

## 2022-05-09 NOTE — PROGRESS NOTES
[facility] New Patient Office Note     Encounter Date:22  Patient:Sravanthi Kauffman  :1980  MRN:[id number]    Referring Provider: TARIK Castillo    Consulted for: Preoperative evaluation and abnormal EKG    Chief Complaint:   Chief Complaint   Patient presents with   • Abnormal ECG     History of Presenting Illness:      Ms. Kauffman is a 42 y.o. woman with past medical history notable for morbid obesity status post gastric sleeve in 2012 as well as migraines who presents to our office for initial evaluation prior to proceeding forward with elective gastric bypass surgery.  Patient was seen initially and was noted to have APCs on her EKG as well as some symptoms of dyspnea exertion.  For these reasons she was sent to our office.  Overall patient is actually fairly active.  She works at [employer].  She does assembly line of the drive shaft.  She does have to do a lot of heavy lifting and carrying objects.  She is able to do this without any major limitation.  Her biggest limitation is hip pain.  She denies any chest pains.  She has occasional palpitations but nothing severe just feels like a skipped beat or 2.  She denies any presyncopal or syncopal spells.  She did have a prior cardiac work-up in 2012 prior to her prior gastric surgery which was reportedly unrevealing per her report but records not available to me.  In general she is doing fairly well but interested in trying to do better to lose weight.      Review of Systems:  Review of Systems   Constitutional: Positive for malaise/fatigue.   HENT: Negative.    Eyes: Negative.    Cardiovascular: Positive for dyspnea on exertion.   Respiratory: Positive for shortness of breath.    Endocrine: Negative.    Hematologic/Lymphatic: Negative.    Skin: Negative.    Musculoskeletal: Positive for joint pain.   Gastrointestinal: Negative.    Genitourinary: Negative.    Neurological: Negative.    Psychiatric/Behavioral: Negative.     Allergic/Immunologic: Negative.        Current Outpatient Medications on File Prior to Visit   Medication Sig Dispense Refill   • ferrous sulfate 325 (65 FE) MG tablet Take 1 tablet by mouth Daily With Breakfast for 90 days. Can take with food to help prevent nausea. 30 tablet 2   • meloxicam (Mobic) 15 MG tablet Take 1 tablet by mouth Daily As Needed for Moderate Pain . 21 tablet 0   • topiramate (TOPAMAX) 25 MG tablet TAKE 1 TABLET BY MOUTH EVERY NIGHT 90 tablet 0   • [DISCONTINUED] methylPREDNISolone (MEDROL) 4 MG dose pack Take as directed on package instructions. 21 each 0     No current facility-administered medications on file prior to visit.       Allergies   Allergen Reactions   • Latex Itching       Past Medical History:   Diagnosis Date   • Anemia    • Obesity    • Osteoarthritis of left knee        Past Surgical History:   Procedure Laterality Date   •  SECTION  1998   • GASTRIC SLEEVE LAPAROSCOPIC  2012       Social History     Socioeconomic History   • Marital status: Single   Tobacco Use   • Smoking status: Never Smoker   • Smokeless tobacco: Never Used   Vaping Use   • Vaping Use: Never used   Substance and Sexual Activity   • Alcohol use: Yes     Comment: rare/caffeine use   • Drug use: Never   • Sexual activity: Defer       Family History   Problem Relation Age of Onset   • Chronic fatigue Mother    • Cancer Mother    • Obesity Mother    • Hypertension Mother    • Hyperlipidemia Father    • Obesity Father    • Hypertension Father    • No Known Problems Sister    • Obesity Brother    • Sleep apnea Brother    • Sleep apnea Brother    • Obesity Brother    • No Known Problems Brother    • No Known Problems Son    • Obesity Maternal Grandmother    • Stroke Maternal Grandmother    • Cancer Maternal Grandfather    • Prostate cancer Maternal Grandfather    • No Known Problems Paternal Grandmother    • No Known Problems Paternal Grandfather        The following portions of the  "patient's history were reviewed and updated as appropriate: allergies, current medications, past family history, past medical history, past social history, past surgical history and problem list.       Objective:       Vitals:    05/09/22 1259   BP: 128/84   BP Location: Left arm   Patient Position: Sitting   Pulse: 72   Weight: 116 kg (256 lb 6.4 oz)   Height: 160.5 cm (63.19\")       Body mass index is 45.15 kg/m².    Physical Exam:  Constitutional: Well appearing, Well-developed, No acute distress   HENT: Oropharynx clear and membrane moist  Eyes: Normal conjunctiva, no sclera icterus.  Neck: Supple, no carotid bruit bilaterally.  Cardiovascular: Regular rate and rhythm, No Murmur, No bilateral lower extremity edema.  Pulmonary: Normal respiratory effort, Normal lung sounds, no wheezing.  Abdominal: Soft, nontender, no hepatosplenomegaly, liver is non-pulsatile.  Neurological: Alert and orient x 3.   Skin: Warm, dry, no ecchymosis, no rash.  Psych: Appropriate mood and affect. Normal judgment and insight.      Lab Results   Component Value Date     04/22/2022    K 4.2 04/22/2022     (H) 04/22/2022    CO2 24.3 04/22/2022    BUN 17 04/22/2022    CREATININE 0.77 04/22/2022    GLUCOSE 87 04/22/2022    CALCIUM 8.8 04/22/2022    ALBUMIN 4.10 04/22/2022    BILITOT 0.2 04/22/2022    AST 23 04/22/2022    ALT 23 04/22/2022     Lab Results   Component Value Date    WBC 5.27 04/22/2022    HGB 11.2 (L) 04/22/2022    HCT 35.3 04/22/2022    MCV 93.1 04/22/2022     04/22/2022     No results found for: CHOL, TRIG, HDL, LDL  No results found for: PROBNP, BNP  No results found for: CKTOTAL, CKMB, CKMBINDEX, TROPONINI, TROPONINT  Lab Results   Component Value Date    TSH 1.470 04/22/2022         ECG 12 Lead    Date/Time: 5/9/2022 1:42 PM  Performed by: Julio Cisneros MD  Authorized by: Julio Cisneros MD   Comparison: compared with previous ECG from 4/22/2022  Comparison to previous ECG: APCs have " resolved  Rhythm: sinus rhythm  Other findings: poor R wave progression              Assessment:          Diagnosis Plan   1. Preop cardiovascular exam  ECG 12 Lead   2. Obesity, Class III, BMI 40-49.9 (morbid obesity) ([facility])     3. APC (atrial premature contractions)  Adult Transthoracic Echo Complete W/ Cont if Necessary Per Protocol    ECG 12 Lead   4. HALL (dyspnea on exertion)  Adult Transthoracic Echo Complete W/ Cont if Necessary Per Protocol    ECG 12 Lead          Plan:       Ms. Kauffman is a 42 y.o. woman with past medical history notable for morbid obesity status post gastric sleeve in 2012 as well as migraines who presents to our office for initial evaluation prior to proceeding forward with elective gastric bypass surgery.  Clinically the patient is doing quite well.  She does have some mild dyspnea on exertion which she relates more to her weight but would be reasonable to check an echocardiogram to make sure there is no structural abnormalities with the heart which could be contributing.  She also was noted to have some APCs on her EKG during her screening ECG which again are not terribly concerning but we can again double check on her echocardiogram to make sure there is no significant issues with her heart structurally.  Otherwise her echocardiogram is normal she is reasonably active and can perform greater than 4 METS of activity and may proceed forward with surgery without any further testing if her echocardiogram is normal.    Preoperative evaluation:  · Follow-up on echocardiogram but if normal patient is functionally doing quite well without any major concerning symptoms may proceed forward with her surgery as planned without any further cardiac testing  · Obviously if any significant abnormalities on her echocardiogram can readdress her operative risk    Premature atrial contractions:  · Minimal symptoms  · Normal TSH and CMP  · Follow-up on echocardiogram results  · Would monitor  symptomatically no need to initiate any medical therapy at this time    Obesity:  · Body mass index is 45.15 kg/m².   · Patient currently working on increasing activity level and diet to help lose weight    Dyspnea on exertion:  · Could be related to weight but will rule out any structural abnormalities of the heart with echocardiogram      Follow up:  As needed based off echocardiogram findings      Thank you for allowing me to participate in the care of Sravanthi Kauffman. Feel free to contact me directly with any further questions or concerns.    Julio Cisneros MD  [facility]  05/09/22  13:43 EDT

## 2022-05-12 ENCOUNTER — OFFICE VISIT (OUTPATIENT)
Dept: BARIATRICS/WEIGHT MGMT | Facility: CLINIC | Age: 42
End: 2022-05-12

## 2022-05-12 VITALS
WEIGHT: 258 LBS | RESPIRATION RATE: 18 BRPM | HEIGHT: 63 IN | DIASTOLIC BLOOD PRESSURE: 89 MMHG | TEMPERATURE: 97.1 F | SYSTOLIC BLOOD PRESSURE: 128 MMHG | BODY MASS INDEX: 45.71 KG/M2 | HEART RATE: 71 BPM

## 2022-05-12 DIAGNOSIS — M54.31 RIGHT SIDED SCIATICA: ICD-10-CM

## 2022-05-12 DIAGNOSIS — E66.01 OBESITY, CLASS III, BMI 40-49.9 (MORBID OBESITY): Primary | ICD-10-CM

## 2022-05-12 DIAGNOSIS — M54.18 RIGHT SACRAL RADICULOPATHY: ICD-10-CM

## 2022-05-12 DIAGNOSIS — Z98.84 S/P LAPAROSCOPIC SLEEVE GASTRECTOMY: ICD-10-CM

## 2022-05-12 PROCEDURE — 99213 OFFICE O/P EST LOW 20 MIN: CPT | Performed by: NURSE PRACTITIONER

## 2022-05-12 RX ORDER — MELOXICAM 15 MG/1
15 TABLET ORAL DAILY PRN
Qty: 21 TABLET | Refills: 0 | Status: SHIPPED | OUTPATIENT
Start: 2022-05-12 | End: 2022-06-29

## 2022-05-12 NOTE — PROGRESS NOTES
[facility]  [address] [facility] [address]  [facility] [address]  [phone]  [address] [id number] [phone]  Dept: [phone]  5/12/2022      Sravanthi Kauffman.  [id number]  [id number]  1980  female      Chief Complaint   Patient presents with   • Consult     DIET 3/3       The patient is here for month 3/3 of their pre-operative physician supervised diet. Today's weight is 117 kg (258 lb) pounds and had a loss of  lbs. The patient states that she is following the recommendations given by our office and dietician including a high lean protein, low carb and low fat diet. We recommended adequate fruits and vegetable intake along with limited portion sizes. Patient is working on eliminating fast foods, fried foods, sweets and soda. Sravanthi Kauffman has been increasing her daily water intake. She denies regular exercise: she is very active at work and is working outside in the heat    Patient states they have made positive changes including getting 2 meals per day, not always getting breakfast. She isn't much a snacker. She is getting at least 64oz of water, she has cut soda out completely. She very occassionally has an orange juice. She is leading with protein at both of her meals at home but doesn't usually eat at work because it is so late.       Review of Systems   Constitutional: Positive for fatigue. Negative for appetite change and unexpected weight change.   HENT: Negative.    Eyes: Negative.    Respiratory: Negative.    Cardiovascular: Negative.  Negative for leg swelling.   Gastrointestinal: Negative for abdominal distention, abdominal pain, constipation, diarrhea, nausea and vomiting.   Genitourinary: Negative for difficulty urinating, frequency and urgency.   Musculoskeletal: Negative for back pain.   Skin: Negative.    Psychiatric/Behavioral: Negative.    All other systems reviewed and are negative.    Vitals:     05/12/22 1136   BP: 128/89   Pulse: 71   Resp: 18   Temp: 97.1 °F (36.2 °C)     Patient Active Problem List   Diagnosis   • Constipation   • Shoulder pain   • Hip pain   • Back pain   • Carpal tunnel syndrome   • Anemia   • Obesity, Class III, BMI 40-49.9 (morbid obesity) ([facility])   • S/P laparoscopic sleeve gastrectomy   • Numbness and tingling   • APC (atrial premature contractions)     Body mass index is 45.37 kg/m².    The following portions of the patient's history were reviewed and updated as appropriate: active problem list, medication list, social history, health maintenance    Physical Exam  Vitals and nursing note reviewed.   Constitutional:       Appearance: She is well-developed.   Neck:      Thyroid: No thyromegaly.   Cardiovascular:      Rate and Rhythm: Normal rate.   Pulmonary:      Effort: Pulmonary effort is normal. No respiratory distress.   Abdominal:      Palpations: Abdomen is soft.   Musculoskeletal:         General: No tenderness.   Skin:     General: Skin is warm and dry.   Neurological:      Mental Status: She is alert.   Psychiatric:         Behavior: Behavior normal.         Discussion/Plan:  Obesity/Morbid Obesity: Currently the patient's weight is decreasing. There are no medications prescribed.Treatment plan includes prescribed diet, prescribed exercise regimen and behavior modification.    I reviewed the appropriate dietary choices with the patient and encouraged the necessary changes. Recommended at least 70 grams of protein per day, around 35 grams of fats and less than 100 grams of carbohydrates. Reviewed calorie intake if patient wanted to calorie count and/or had BMR. Instructed patient to drink half of body weight in ounces per day and exercise a minimum of 150 minutes per week including both cardio and strength training. Discussed the option of keeping a food journal which will help patient become more aware of the nutritional value of foods so they are more prepared after  surgery.    The patient was given written materials from our office for education.   I answered all of the patients questions regarding dietary changes, exercise or surgical options.  The patient will follow up in 1 month. The total time spent during this encounter was 25 minutes    Encounter Diagnoses   Name Primary?   • Obesity, Class III, BMI 40-49.9 (morbid obesity) ([facility]) Yes   • S/P laparoscopic sleeve gastrectomy

## 2022-05-18 ENCOUNTER — HOSPITAL ENCOUNTER (OUTPATIENT)
Dept: CARDIOLOGY | Facility: HOSPITAL | Age: 42
Discharge: HOME | End: 2022-05-18
Admitting: INTERNAL MEDICINE

## 2022-05-18 ENCOUNTER — TELEPHONE (OUTPATIENT)
Dept: CARDIOLOGY | Facility: CLINIC | Age: 42
End: 2022-05-18

## 2022-05-18 VITALS
BODY MASS INDEX: 45.71 KG/M2 | WEIGHT: 258 LBS | DIASTOLIC BLOOD PRESSURE: 80 MMHG | SYSTOLIC BLOOD PRESSURE: 149 MMHG | HEIGHT: 63 IN | OXYGEN SATURATION: 100 % | HEART RATE: 66 BPM

## 2022-05-18 DIAGNOSIS — R06.09 DOE (DYSPNEA ON EXERTION): ICD-10-CM

## 2022-05-18 DIAGNOSIS — I49.1 APC (ATRIAL PREMATURE CONTRACTIONS): ICD-10-CM

## 2022-05-18 LAB
AORTIC ARCH: 2.7 CM
ASCENDING AORTA: 2.4 CM
BH CV ECHO MEAS - ACS: 2.16 CM
BH CV ECHO MEAS - AO MAX PG: 9.1 MMHG
BH CV ECHO MEAS - AO MEAN PG: 5.5 MMHG
BH CV ECHO MEAS - AO ROOT DIAM: 3.1 CM
BH CV ECHO MEAS - AO V2 MAX: 150.8 CM/SEC
BH CV ECHO MEAS - AO V2 VTI: 39.7 CM
BH CV ECHO MEAS - AVA(I,D): 2.8 CM2
BH CV ECHO MEAS - EDV(CUBED): 110.6 ML
BH CV ECHO MEAS - EDV(MOD-SP2): 104 ML
BH CV ECHO MEAS - EDV(MOD-SP4): 104 ML
BH CV ECHO MEAS - EF(MOD-BP): 64.1 %
BH CV ECHO MEAS - EF(MOD-SP2): 64.4 %
BH CV ECHO MEAS - EF(MOD-SP4): 62.5 %
BH CV ECHO MEAS - ESV(CUBED): 28.9 ML
BH CV ECHO MEAS - ESV(MOD-SP2): 37 ML
BH CV ECHO MEAS - ESV(MOD-SP4): 39 ML
BH CV ECHO MEAS - FS: 36.1 %
BH CV ECHO MEAS - IVS/LVPW: 1.22 CM
BH CV ECHO MEAS - IVSD: 1.4 CM
BH CV ECHO MEAS - LAT PEAK E' VEL: 12.5 CM/SEC
BH CV ECHO MEAS - LV DIASTOLIC VOL/BSA (35-75): 48.3 CM2
BH CV ECHO MEAS - LV MASS(C)D: 238.9 GRAMS
BH CV ECHO MEAS - LV MAX PG: 6.8 MMHG
BH CV ECHO MEAS - LV MEAN PG: 3.9 MMHG
BH CV ECHO MEAS - LV SYSTOLIC VOL/BSA (12-30): 18.1 CM2
BH CV ECHO MEAS - LV V1 MAX: 130.7 CM/SEC
BH CV ECHO MEAS - LV V1 VTI: 31.7 CM
BH CV ECHO MEAS - LVIDD: 4.8 CM
BH CV ECHO MEAS - LVIDS: 3.1 CM
BH CV ECHO MEAS - LVOT AREA: 3.5 CM2
BH CV ECHO MEAS - LVOT DIAM: 2.1 CM
BH CV ECHO MEAS - LVPWD: 1.15 CM
BH CV ECHO MEAS - MED PEAK E' VEL: 10.1 CM/SEC
BH CV ECHO MEAS - MR MAX PG: 121.3 MMHG
BH CV ECHO MEAS - MR MAX VEL: 550.6 CM/SEC
BH CV ECHO MEAS - MV A DUR: 0.17 SEC
BH CV ECHO MEAS - MV A MAX VEL: 81.4 CM/SEC
BH CV ECHO MEAS - MV DEC SLOPE: 466.5 CM/SEC2
BH CV ECHO MEAS - MV DEC TIME: 0.16 MSEC
BH CV ECHO MEAS - MV E MAX VEL: 80.6 CM/SEC
BH CV ECHO MEAS - MV E/A: 0.99
BH CV ECHO MEAS - MV MAX PG: 4.6 MMHG
BH CV ECHO MEAS - MV MEAN PG: 1.63 MMHG
BH CV ECHO MEAS - MV P1/2T: 58.8 MSEC
BH CV ECHO MEAS - MV V2 VTI: 28.2 CM
BH CV ECHO MEAS - MVA(P1/2T): 3.7 CM2
BH CV ECHO MEAS - MVA(VTI): 3.9 CM2
BH CV ECHO MEAS - PA ACC TIME: 0.13 SEC
BH CV ECHO MEAS - PA PR(ACCEL): 19.6 MMHG
BH CV ECHO MEAS - PA V2 MAX: 90.9 CM/SEC
BH CV ECHO MEAS - PULM A REVS DUR: 0.15 SEC
BH CV ECHO MEAS - PULM A REVS VEL: 62.2 CM/SEC
BH CV ECHO MEAS - PULM DIAS VEL: 55.6 CM/SEC
BH CV ECHO MEAS - PULM S/D: 1.28
BH CV ECHO MEAS - PULM SYS VEL: 71 CM/SEC
BH CV ECHO MEAS - QP/QS: 0.49
BH CV ECHO MEAS - RAP SYSTOLE: 3 MMHG
BH CV ECHO MEAS - RV MAX PG: 1.67 MMHG
BH CV ECHO MEAS - RV V1 MAX: 64.7 CM/SEC
BH CV ECHO MEAS - RV V1 VTI: 18.6 CM
BH CV ECHO MEAS - RVOT DIAM: 1.92 CM
BH CV ECHO MEAS - RVSP: 27.1 MMHG
BH CV ECHO MEAS - SI(MOD-SP2): 31.1 ML/M2
BH CV ECHO MEAS - SI(MOD-SP4): 30.2 ML/M2
BH CV ECHO MEAS - SUP REN AO DIAM: 2.5 CM
BH CV ECHO MEAS - SV(LVOT): 109.6 ML
BH CV ECHO MEAS - SV(MOD-SP2): 67 ML
BH CV ECHO MEAS - SV(MOD-SP4): 65 ML
BH CV ECHO MEAS - SV(RVOT): 53.7 ML
BH CV ECHO MEAS - TAPSE (>1.6): 2.28 CM
BH CV ECHO MEAS - TR MAX PG: 24.1 MMHG
BH CV ECHO MEAS - TR MAX VEL: 245.7 CM/SEC
BH CV ECHO MEASUREMENTS AVERAGE E/E' RATIO: 7.13
BH CV XLRA - RV BASE: 3.8 CM
BH CV XLRA - RV LENGTH: 7.1 CM
BH CV XLRA - RV MID: 2.5 CM
BH CV XLRA - TDI S': 13.2 CM/SEC
LEFT ATRIUM VOLUME INDEX: 21.2 ML/M2
MAXIMAL PREDICTED HEART RATE: 177 BPM
SINUS: 2.8 CM
STJ: 2.3 CM
STRESS TARGET HR: 150 BPM

## 2022-05-18 PROCEDURE — 25010000002 PERFLUTREN (DEFINITY) 8.476 MG IN SODIUM CHLORIDE (PF) 0.9 % 10 ML INJECTION: Performed by: INTERNAL MEDICINE

## 2022-05-18 PROCEDURE — 93306 TTE W/DOPPLER COMPLETE: CPT | Performed by: INTERNAL MEDICINE

## 2022-05-18 PROCEDURE — 93306 TTE W/DOPPLER COMPLETE: CPT

## 2022-05-18 RX ADMIN — PERFLUTREN 3 ML: 6.52 INJECTION, SUSPENSION INTRAVENOUS at 09:10

## 2022-05-18 NOTE — TELEPHONE ENCOUNTER
Called and left message on patient's voicemail as well as through her MyChart regarding echocardiogram results which demonstrate normal heart function and overall structurally normal heart.  Patient may proceed forward with consideration for gastric bypass surgery/lap band surgery as planned no further cardiac testing is needed at this time.  She will see us back in the office on an as-needed basis for any new cardiac issues.

## 2022-06-04 ENCOUNTER — LAB (OUTPATIENT)
Dept: LAB | Facility: HOSPITAL | Age: 42
End: 2022-06-04

## 2022-06-04 DIAGNOSIS — K59.00 CONSTIPATION, UNSPECIFIED CONSTIPATION TYPE: ICD-10-CM

## 2022-06-04 DIAGNOSIS — G89.29 CHRONIC BACK PAIN, UNSPECIFIED BACK LOCATION, UNSPECIFIED BACK PAIN LATERALITY: ICD-10-CM

## 2022-06-04 DIAGNOSIS — D50.8 OTHER IRON DEFICIENCY ANEMIA: ICD-10-CM

## 2022-06-04 DIAGNOSIS — E66.01 OBESITY, CLASS III, BMI 40-49.9 (MORBID OBESITY): ICD-10-CM

## 2022-06-04 DIAGNOSIS — R20.0 NUMBNESS AND TINGLING: ICD-10-CM

## 2022-06-04 DIAGNOSIS — Z98.84 S/P LAPAROSCOPIC SLEEVE GASTRECTOMY: ICD-10-CM

## 2022-06-04 DIAGNOSIS — M54.9 CHRONIC BACK PAIN, UNSPECIFIED BACK LOCATION, UNSPECIFIED BACK PAIN LATERALITY: ICD-10-CM

## 2022-06-04 DIAGNOSIS — R20.2 NUMBNESS AND TINGLING: ICD-10-CM

## 2022-06-04 LAB — SARS-COV-2 ORF1AB RESP QL NAA+PROBE: NOT DETECTED

## 2022-06-04 PROCEDURE — U0004 COV-19 TEST NON-CDC HGH THRU: HCPCS

## 2022-06-04 PROCEDURE — C9803 HOPD COVID-19 SPEC COLLECT: HCPCS

## 2022-06-07 ENCOUNTER — HOSPITAL ENCOUNTER (OUTPATIENT)
Facility: HOSPITAL | Age: 42
Setting detail: HOSPITAL OUTPATIENT SURGERY
Discharge: HOME | End: 2022-06-07
Attending: SURGERY | Admitting: SURGERY

## 2022-06-07 ENCOUNTER — ANESTHESIA EVENT (OUTPATIENT)
Dept: GASTROENTEROLOGY | Facility: HOSPITAL | Age: 42
End: 2022-06-07

## 2022-06-07 ENCOUNTER — ANESTHESIA (OUTPATIENT)
Dept: GASTROENTEROLOGY | Facility: HOSPITAL | Age: 42
End: 2022-06-07

## 2022-06-07 VITALS
BODY MASS INDEX: 46.78 KG/M2 | DIASTOLIC BLOOD PRESSURE: 95 MMHG | WEIGHT: 264 LBS | HEART RATE: 75 BPM | HEIGHT: 63 IN | OXYGEN SATURATION: 97 % | SYSTOLIC BLOOD PRESSURE: 122 MMHG | RESPIRATION RATE: 16 BRPM

## 2022-06-07 DIAGNOSIS — G89.29 CHRONIC BACK PAIN, UNSPECIFIED BACK LOCATION, UNSPECIFIED BACK PAIN LATERALITY: ICD-10-CM

## 2022-06-07 DIAGNOSIS — D50.8 OTHER IRON DEFICIENCY ANEMIA: ICD-10-CM

## 2022-06-07 DIAGNOSIS — K59.00 CONSTIPATION, UNSPECIFIED CONSTIPATION TYPE: ICD-10-CM

## 2022-06-07 DIAGNOSIS — Z98.84 S/P LAPAROSCOPIC SLEEVE GASTRECTOMY: ICD-10-CM

## 2022-06-07 DIAGNOSIS — R20.2 NUMBNESS AND TINGLING: ICD-10-CM

## 2022-06-07 DIAGNOSIS — M54.9 CHRONIC BACK PAIN, UNSPECIFIED BACK LOCATION, UNSPECIFIED BACK PAIN LATERALITY: ICD-10-CM

## 2022-06-07 DIAGNOSIS — R20.0 NUMBNESS AND TINGLING: ICD-10-CM

## 2022-06-07 DIAGNOSIS — E66.01 OBESITY, CLASS III, BMI 40-49.9 (MORBID OBESITY): ICD-10-CM

## 2022-06-07 PROCEDURE — 25010000002 PROPOFOL 10 MG/ML EMULSION: Performed by: ANESTHESIOLOGY

## 2022-06-07 PROCEDURE — 43239 EGD BIOPSY SINGLE/MULTIPLE: CPT | Performed by: SURGERY

## 2022-06-07 PROCEDURE — 87081 CULTURE SCREEN ONLY: CPT | Performed by: SURGERY

## 2022-06-07 RX ORDER — LIDOCAINE HYDROCHLORIDE 20 MG/ML
INJECTION, SOLUTION INFILTRATION; PERINEURAL AS NEEDED
Status: DISCONTINUED | OUTPATIENT
Start: 2022-06-07 | End: 2022-06-07 | Stop reason: SURG

## 2022-06-07 RX ORDER — SODIUM CHLORIDE 0.9 % (FLUSH) 0.9 %
10 SYRINGE (ML) INJECTION EVERY 12 HOURS SCHEDULED
Status: DISCONTINUED | OUTPATIENT
Start: 2022-06-07 | End: 2022-06-07 | Stop reason: HOSPADM

## 2022-06-07 RX ORDER — SODIUM CHLORIDE 0.9 % (FLUSH) 0.9 %
10 SYRINGE (ML) INJECTION AS NEEDED
Status: DISCONTINUED | OUTPATIENT
Start: 2022-06-07 | End: 2022-06-07 | Stop reason: HOSPADM

## 2022-06-07 RX ORDER — SODIUM CHLORIDE, SODIUM LACTATE, POTASSIUM CHLORIDE, CALCIUM CHLORIDE 600; 310; 30; 20 MG/100ML; MG/100ML; MG/100ML; MG/100ML
30 INJECTION, SOLUTION INTRAVENOUS CONTINUOUS PRN
Status: DISCONTINUED | OUTPATIENT
Start: 2022-06-07 | End: 2022-06-07 | Stop reason: HOSPADM

## 2022-06-07 RX ORDER — PROPOFOL 10 MG/ML
VIAL (ML) INTRAVENOUS CONTINUOUS PRN
Status: DISCONTINUED | OUTPATIENT
Start: 2022-06-07 | End: 2022-06-07 | Stop reason: SURG

## 2022-06-07 RX ORDER — PROPOFOL 10 MG/ML
VIAL (ML) INTRAVENOUS AS NEEDED
Status: DISCONTINUED | OUTPATIENT
Start: 2022-06-07 | End: 2022-06-07 | Stop reason: SURG

## 2022-06-07 RX ADMIN — LIDOCAINE HYDROCHLORIDE 60 MG: 20 INJECTION, SOLUTION INFILTRATION; PERINEURAL at 08:48

## 2022-06-07 RX ADMIN — PROPOFOL 200 MG: 10 INJECTION, EMULSION INTRAVENOUS at 08:48

## 2022-06-07 RX ADMIN — SODIUM CHLORIDE, POTASSIUM CHLORIDE, SODIUM LACTATE AND CALCIUM CHLORIDE 30 ML/HR: 600; 310; 30; 20 INJECTION, SOLUTION INTRAVENOUS at 08:32

## 2022-06-07 RX ADMIN — Medication 200 MCG/KG/MIN: at 08:48

## 2022-06-07 NOTE — DISCHARGE INSTRUCTIONS
For the next 24 hours patient needs to be with a responsible adult.    For 24 hours DO NOT drive, operate machinery, appliances, drink alcohol, make important decisions or sign legal documents.    Start with a light or bland diet if you are feeling sick to your stomach otherwise advance to regular diet as tolerated.    Follow recommendations on procedure report if provided by your doctor.    Call Dr Arango for problems [phone]    Problems may include but not limited to: large amounts of bleeding, trouble breathing, repeated vomiting, severe unrelieved pain, fever or chills.

## 2022-06-07 NOTE — OP NOTE
Surgeon: Vinicio Arango Jr., M.D.    Preoperative Diagnosis: #1 dyspepsia #2 history of laparoscopic sleeve gastrectomy in [place]    Postoperative Diagnosis: #1 gastritis #2 dilated gastric sleeve    Procedure Performed: Transoral esophagogastroduodenoscopy with gastric antral biopsies    Indications: 42-year-old female status post laparoscopic sleeve gastrectomy in [place] who presents for preoperative evaluation.  Patient does not take H2 blocker or PPI on regular basis.    Procedure:     The procedure, indications, preparation and potential complications were explained to the patient, who indicated understanding and signed the corresponding consent forms.  The patient was identified, taken to the endoscopy suite, and placed on the left side down decubitus position.  The patient underwent a MAC anesthesia and was appropriately monitored through the case by the anesthesia personnel with continuous pulse oximetry, blood pressure, and cardiac monitoring.  A bite block was placed.  After adequate IV sedation and using a transoral technique a lubed flexible endoscope was placed in the hypopharynx and advanced to the second portion of the duodenum without difficulty. The scope was then withdrawn back into the antrum of the stomach.  Cold forcep biopsies of the antrum were taken to rule out Helicobacter pylori.  The scope was retroflexed noting the body, fundus and cardia.  The scope was then withdrawn back into the esophagus after decompressing the stomach.  The Z line was noted and GE junction measured from the incisors.  The scope was then completely withdrawn.  The patient tolerated the procedure well and left the endoscopy suite in stable condition.  The findings are listed below.    Duodenum: Unremarkable  Antrum: Patchy erythema  Body/Fundus: System with dilated gastric sleeve  Cardia: Unremarkable  Esophagus: Z-line regular, no erosive esophagitis    Recommendations:     Await biopsy results and follow-up  in the office     None known

## 2022-06-07 NOTE — ANESTHESIA POSTPROCEDURE EVALUATION
Patient: Sravanthi Kauffman    Procedure Summary     Date: 06/07/22 Room / Location:  [facility]    Anesthesia Start: 0844 Anesthesia Stop: 0857    Procedure: ESOPHAGOGASTRODUODENOSCOPY WITH BIOPSY (N/A Esophagus) Diagnosis:       Obesity, Class III, BMI 40-49.9 (morbid obesity) ([facility])      S/P laparoscopic sleeve gastrectomy      Chronic back pain, unspecified back location, unspecified back pain laterality      Numbness and tingling      Other iron deficiency anemia      Constipation, unspecified constipation type      (Obesity, Class III, BMI 40-49.9 (morbid obesity) ([facility]) [E66.01])      (S/P laparoscopic sleeve gastrectomy [Z98.84])      (Chronic back pain, unspecified back location, unspecified back pain laterality [M54.9, G89.29])      (Numbness and tingling [R20.0, R20.2])      (Other iron deficiency anemia [D50.8])      (Constipation, unspecified constipation type [K59.00])    Surgeons: Vinicio Arango Jr., MD Provider: Perry Barraza MD    Anesthesia Type: MAC ASA Status: 3          Anesthesia Type: MAC    Vitals  No vitals data found for the desired time range.          Post Anesthesia Care and Evaluation    Patient location during evaluation: PHASE II  Patient participation: complete - patient participated  Level of consciousness: awake and alert  Pain management: adequate    Airway patency: patent  Anesthetic complications: No anesthetic complications  PONV Status: none  Cardiovascular status: acceptable and hemodynamically stable  Respiratory status: acceptable, nonlabored ventilation and spontaneous ventilation  Hydration status: acceptable

## 2022-06-07 NOTE — ANESTHESIA PREPROCEDURE EVALUATION
Anesthesia Evaluation     Patient summary reviewed and Nursing notes reviewed   NPO Solid Status: > 8 hours  NPO Liquid Status: > 2 hours           Airway   Mallampati: III  TM distance: >3 FB  Neck ROM: full  Possible difficult intubation  Dental - normal exam     Pulmonary - normal exam   Cardiovascular - normal exam    ECG reviewed        Neuro/Psych  (+) numbness,      ROS Comment: CTS  GI/Hepatic/Renal/Endo    (+) obesity, morbid obesity,      ROS Comment: Hx gastric sleeve    Musculoskeletal     (+) back pain, chronic pain,   Abdominal   (+) obese,    Substance History      OB/GYN          Other   arthritis,                    Anesthesia Plan    ASA 3     MAC     intravenous induction     Anesthetic plan, all risks, benefits, and alternatives have been provided, discussed and informed consent has been obtained with: patient.        CODE STATUS:

## 2022-06-08 ENCOUNTER — TELEPHONE (OUTPATIENT)
Dept: BARIATRICS/WEIGHT MGMT | Facility: CLINIC | Age: 42
End: 2022-06-08

## 2022-06-08 LAB — UREASE TISS QL: NEGATIVE

## 2022-06-08 NOTE — TELEPHONE ENCOUNTER
Left message inform about results EGD  ----- Message from Vinicio Arango Jr., MD sent at 6/8/2022  2:12 PM EDT -----  Please call patient with negative results.

## 2022-06-29 DIAGNOSIS — M54.18 RIGHT SACRAL RADICULOPATHY: ICD-10-CM

## 2022-06-29 DIAGNOSIS — M54.31 RIGHT SIDED SCIATICA: ICD-10-CM

## 2022-06-29 RX ORDER — MELOXICAM 15 MG/1
15 TABLET ORAL DAILY PRN
Qty: 21 TABLET | Refills: 0 | Status: SHIPPED | OUTPATIENT
Start: 2022-06-29 | End: 2022-08-02

## 2022-08-02 ENCOUNTER — PREP FOR SURGERY (OUTPATIENT)
Dept: OTHER | Facility: HOSPITAL | Age: 42
End: 2022-08-02

## 2022-08-02 DIAGNOSIS — M54.18 RIGHT SACRAL RADICULOPATHY: ICD-10-CM

## 2022-08-02 DIAGNOSIS — M54.31 RIGHT SIDED SCIATICA: ICD-10-CM

## 2022-08-02 DIAGNOSIS — E66.01 OBESITY, CLASS III, BMI 40-49.9 (MORBID OBESITY): Primary | ICD-10-CM

## 2022-08-02 RX ORDER — SODIUM CHLORIDE 0.9 % (FLUSH) 0.9 %
10 SYRINGE (ML) INJECTION EVERY 12 HOURS SCHEDULED
Status: CANCELLED | OUTPATIENT
Start: 2022-09-12

## 2022-08-02 RX ORDER — GABAPENTIN 250 MG/5ML
300 SOLUTION ORAL ONCE
Status: CANCELLED | OUTPATIENT
Start: 2022-09-12 | End: 2022-08-02

## 2022-08-02 RX ORDER — SODIUM CHLORIDE 0.9 % (FLUSH) 0.9 %
1-10 SYRINGE (ML) INJECTION AS NEEDED
Status: CANCELLED | OUTPATIENT
Start: 2022-09-12

## 2022-08-02 RX ORDER — PROMETHAZINE HYDROCHLORIDE 12.5 MG/1
12.5 TABLET ORAL ONCE
Status: CANCELLED | OUTPATIENT
Start: 2022-09-12 | End: 2022-08-02

## 2022-08-02 RX ORDER — METOCLOPRAMIDE HYDROCHLORIDE 5 MG/ML
10 INJECTION INTRAMUSCULAR; INTRAVENOUS ONCE
Status: CANCELLED | OUTPATIENT
Start: 2022-09-12 | End: 2022-08-02

## 2022-08-02 RX ORDER — SCOLOPAMINE TRANSDERMAL SYSTEM 1 MG/1
1 PATCH, EXTENDED RELEASE TRANSDERMAL CONTINUOUS
Status: CANCELLED | OUTPATIENT
Start: 2022-09-12 | End: 2022-09-15

## 2022-08-02 RX ORDER — PROMETHAZINE HYDROCHLORIDE 25 MG/1
25 SUPPOSITORY RECTAL ONCE
Status: CANCELLED | OUTPATIENT
Start: 2022-09-12

## 2022-08-02 RX ORDER — MELOXICAM 15 MG/1
15 TABLET ORAL DAILY PRN
Qty: 21 TABLET | Refills: 0 | Status: SHIPPED | OUTPATIENT
Start: 2022-08-02 | End: 2022-08-05

## 2022-08-02 RX ORDER — SODIUM CHLORIDE, SODIUM LACTATE, POTASSIUM CHLORIDE, CALCIUM CHLORIDE 600; 310; 30; 20 MG/100ML; MG/100ML; MG/100ML; MG/100ML
100 INJECTION, SOLUTION INTRAVENOUS CONTINUOUS
Status: CANCELLED | OUTPATIENT
Start: 2022-09-12

## 2022-08-02 RX ORDER — CHLORHEXIDINE GLUCONATE 0.12 MG/ML
15 RINSE ORAL SEE ADMIN INSTRUCTIONS
Status: CANCELLED | OUTPATIENT
Start: 2022-09-12

## 2022-08-02 RX ORDER — PANTOPRAZOLE SODIUM 40 MG/10ML
40 INJECTION, POWDER, LYOPHILIZED, FOR SOLUTION INTRAVENOUS ONCE
Status: CANCELLED | OUTPATIENT
Start: 2022-09-12 | End: 2022-08-02

## 2022-08-02 RX ORDER — CEFAZOLIN SODIUM IN 0.9 % NACL 3 G/100 ML
3 INTRAVENOUS SOLUTION, PIGGYBACK (ML) INTRAVENOUS
Status: CANCELLED | OUTPATIENT
Start: 2022-09-12

## 2022-08-02 RX ORDER — FERROUS SULFATE 325(65) MG
325 TABLET ORAL
COMMUNITY
Start: 2022-07-10

## 2022-08-03 RX ORDER — TOPIRAMATE 25 MG/1
25 TABLET ORAL NIGHTLY
Qty: 90 TABLET | Refills: 0 | Status: SHIPPED | OUTPATIENT
Start: 2022-08-03 | End: 2022-11-04

## 2022-08-05 ENCOUNTER — CONSULT (OUTPATIENT)
Dept: BARIATRICS/WEIGHT MGMT | Facility: CLINIC | Age: 42
End: 2022-08-05

## 2022-08-05 VITALS
HEIGHT: 63 IN | BODY MASS INDEX: 46.42 KG/M2 | HEART RATE: 83 BPM | OXYGEN SATURATION: 98 % | WEIGHT: 262 LBS | RESPIRATION RATE: 18 BRPM | DIASTOLIC BLOOD PRESSURE: 76 MMHG | TEMPERATURE: 97.4 F | SYSTOLIC BLOOD PRESSURE: 124 MMHG

## 2022-08-05 DIAGNOSIS — Z98.84 S/P LAPAROSCOPIC SLEEVE GASTRECTOMY: ICD-10-CM

## 2022-08-05 DIAGNOSIS — E66.01 OBESITY, CLASS III, BMI 40-49.9 (MORBID OBESITY): Primary | ICD-10-CM

## 2022-08-05 DIAGNOSIS — Z71.3 DIETARY COUNSELING: ICD-10-CM

## 2022-08-05 DIAGNOSIS — D50.8 OTHER IRON DEFICIENCY ANEMIA: ICD-10-CM

## 2022-08-05 DIAGNOSIS — K59.00 CONSTIPATION, UNSPECIFIED CONSTIPATION TYPE: ICD-10-CM

## 2022-08-05 DIAGNOSIS — M54.9 CHRONIC BACK PAIN, UNSPECIFIED BACK LOCATION, UNSPECIFIED BACK PAIN LATERALITY: ICD-10-CM

## 2022-08-05 DIAGNOSIS — G89.29 CHRONIC BACK PAIN, UNSPECIFIED BACK LOCATION, UNSPECIFIED BACK PAIN LATERALITY: ICD-10-CM

## 2022-08-05 PROCEDURE — 99215 OFFICE O/P EST HI 40 MIN: CPT | Performed by: SURGERY

## 2022-08-05 RX ORDER — URSODIOL 300 MG/1
300 CAPSULE ORAL 2 TIMES DAILY
Qty: 60 CAPSULE | Refills: 5 | Status: SHIPPED | OUTPATIENT
Start: 2022-08-05

## 2022-08-05 RX ORDER — ENOXAPARIN SODIUM 100 MG/ML
40 INJECTION SUBCUTANEOUS EVERY 12 HOURS SCHEDULED
Qty: 11.2 ML | Refills: 0 | Status: SHIPPED | OUTPATIENT
Start: 2022-08-05 | End: 2022-08-19

## 2022-08-05 NOTE — H&P
Bariatric Consult:  Referred by Cindy Loyola APRN    Sravanthi Kauffman is here today for consult on Consult      History of Present Illness:     Sravanthi Kauffman is a 42 y.o. female with morbid obesity with co-morbidities including back pain and knee pain who presents for surgical consultation for the above procedure. Sravanhti has completed the initial intake visit and has been examined by our nurse practitioner, dietician, psychologist and underwent the extensive educational teaching process under the guidance of our bariatric coordinator and myself. Sravanthi also has seen or if has not yet will see the educational video TOYA on the surgical procedure if available. Sravanthi attended today more educational teaching from our bariatric coordinator and myself. Sravanthi has had an extensive medical workup including a visit with their primary care physician, EKG, chest radiograph, blood work, EGD or UGI and possibly further testing. These have been reviewed by me and discussed with the patient. Sravanthi is now ready to proceed with surgery. Sravanthi presently denies nausea, vomiting, fever, chills, chest pain, shortness of air, melena, hematochezia, hemetemesis, dysuria, frequency, hematuria.       Past Medical History:   Diagnosis Date   • Anemia    • Obesity    • Osteoarthritis of left knee        Encounter Diagnoses   Name Primary?   • Obesity, Class III, BMI 40-49.9 (morbid obesity) ([facility]) Yes   • S/P laparoscopic sleeve gastrectomy    • Dietary counseling    • Chronic back pain, unspecified back location, unspecified back pain laterality    • Other iron deficiency anemia    • Constipation, unspecified constipation type        Past Surgical History:   Procedure Laterality Date   •  SECTION  1998   • ENDOSCOPY N/A 2022    Procedure: ESOPHAGOGASTRODUODENOSCOPY WITH BIOPSY;  Surgeon: Vinicio Arango Jr., MD;  Location: [facility] ENDOSCOPY;  Service: General;  Laterality: N/A;  pre: dyspepsia, hx of  gastric sleeve   post: gastritis    • GASTRIC SLEEVE LAPAROSCOPIC  04/14/2012       Patient Active Problem List   Diagnosis   • Constipation   • Shoulder pain   • Hip pain   • Back pain   • Carpal tunnel syndrome   • Anemia   • Obesity, Class III, BMI 40-49.9 (morbid obesity) ([facility])   • S/P laparoscopic sleeve gastrectomy   • Numbness and tingling   • APC (atrial premature contractions)   • Dietary counseling       Allergies   Allergen Reactions   • Latex Itching         Current Outpatient Medications:   •  FeroSul 325 (65 Fe) MG tablet, , Disp: , Rfl:   •  topiramate (TOPAMAX) 25 MG tablet, TAKE 1 TABLET BY MOUTH EVERY NIGHT, Disp: 90 tablet, Rfl: 0  •  Enoxaparin Sodium (LOVENOX) 40 MG/0.4ML solution prefilled syringe syringe, Inject 0.4 mL under the skin into the appropriate area as directed Every 12 (Twelve) Hours for 14 days. Start after surgery unless instructed otherwise, Disp: 11.2 mL, Rfl: 0  •  folic acid-vit B6-vit B12 (FOLBEE) 2.5-25-1 MG tablet tablet, Take 1 tablet by mouth Daily., Disp: 40 tablet, Rfl: 0  •  ursodiol (Actigall) 300 MG capsule, Take 1 capsule by mouth 2 (Two) Times a Day., Disp: 60 capsule, Rfl: 5    Social History     Socioeconomic History   • Marital status: Single   Tobacco Use   • Smoking status: Never Smoker   • Smokeless tobacco: Never Used   Vaping Use   • Vaping Use: Never used   Substance and Sexual Activity   • Alcohol use: Yes     Comment: rare/caffeine use   • Drug use: Never   • Sexual activity: Defer       Family History   Problem Relation Age of Onset   • Chronic fatigue Mother    • Cancer Mother    • Obesity Mother    • Hypertension Mother    • Hyperlipidemia Father    • Obesity Father    • Hypertension Father    • No Known Problems Sister    • Obesity Brother    • Sleep apnea Brother    • Sleep apnea Brother    • Obesity Brother    • No Known Problems Brother    • No Known Problems Son    • Obesity Maternal Grandmother    • Stroke Maternal Grandmother    • Cancer  Maternal Grandfather    • Prostate cancer Maternal Grandfather    • No Known Problems Paternal Grandmother    • No Known Problems Paternal Grandfather        Review of Systems:  Review of Systems   Constitutional: Positive for fatigue.   Gastrointestinal: Positive for constipation.   Musculoskeletal: Positive for arthralgias and back pain.   All other systems reviewed and are negative.        Physical Exam:    Vital Signs:  Weight: 119 kg (262 lb)   Body mass index is 46.08 kg/m².  Temp: 97.4 °F (36.3 °C)   Heart Rate: 83   BP: 124/76       Physical Exam  Vitals reviewed.   HENT:      Head: Normocephalic and atraumatic.      Mouth/Throat:      Mouth: Mucous membranes are moist.      Pharynx: Oropharynx is clear.   Eyes:      General: No scleral icterus.     Extraocular Movements: Extraocular movements intact.      Conjunctiva/sclera: Conjunctivae normal.      Pupils: Pupils are equal, round, and reactive to light.   Neck:      Thyroid: No thyromegaly.   Cardiovascular:      Rate and Rhythm: Normal rate.   Pulmonary:      Effort: Pulmonary effort is normal. No respiratory distress.      Breath sounds: Normal breath sounds. No stridor. No wheezing or rhonchi.   Abdominal:      General: Bowel sounds are normal.      Palpations: Abdomen is soft.      Tenderness: There is no abdominal tenderness. There is no right CVA tenderness, left CVA tenderness, guarding or rebound.      Hernia: No hernia is present.   Musculoskeletal:         General: Normal range of motion.      Cervical back: Normal range of motion and neck supple.   Lymphadenopathy:      Cervical: No cervical adenopathy.   Skin:     General: Skin is warm and dry.      Findings: No erythema.   Neurological:      Mental Status: She is alert and oriented to person, place, and time.   Psychiatric:         Mood and Affect: Mood normal.         Behavior: Behavior normal.         Thought Content: Thought content normal.         Judgment: Judgment normal.            Assessment:    Sravanthi Kauffman is a 42 y.o. year old female with medically complicated severe obesity with a BMI of Body mass index is 46.08 kg/m². and multiple co-morbidities listed in the encounter diagnosis.    I think she is an appropriate candidate for this surgery, and is ready to proceed.      Plan/Discussion/Summary:  No hiatal hernia per me.  Patient status post laparoscopic sleeve gastrectomy 2012 in [place].  Does not know if hiatal hernia repaired.  No PPI.  H. pylori negative.  Patient with history of anemia without the actual etiology.  She understands increased risk of iron deficiency after gastric bypass because of malabsorption.  I instructed her that this may worsen her anemia.  I did instruct her that she has to be on bariatric vitamins for better absorption which he understands.  Patient understands that they are at an increased risk for complications because of this being a revisional surgery/conversion  to another procedure.  The patient understands the increased risk of gastric injury/leak, bleeding, etc because of the scarring and previous surgery.  Also increased risk of other complications that are listed because of increased OR time.    The patient has returned to the office for a surgical consultation and has requested to proceed with the Divya-en-Y  gastric bypass.  I have had the opportunity to obtain the history, examine the patient and review the patient's chart.  The procedure could be done laparoscopically and or robotically.    The patient understands that surgery is a tool and that weight loss is not guaranteed but only seen in the context of appropriate use, regular follow up, exercise and making appropriate food choices.      I have personally discussed the potential complications of the laparoscopic gastric bypass with this patient.  The patient is well aware of the potential complications of the surgery that include but not limited to bleeding, infections,  deep venous thrombosis, pulmonary embolism, pulmonary complications such as pneumonia, cardiac events, hernias, small bowel obstruction, damage to the spleen or other organs, bowel injury, disfiguring scars, failure to lose weight, need for additional surgery, conversion to an open procedure and death.  I also discussed the risks that apply in particular to the gastric bypass such as the leaking of stomach and/or intestinal contents at the staple or suture line, the development of an intra-abdominal abscess,  strictures, ulcers, and vitamin/mineral deficiencies.  The patient was strongly advised to avoid smoking and the use of non-steroidal anti-inflammatory drugs such as ibuprofen, Motrin and Advil in the postoperative period and understands the increased risk of ulcer formation, perforation, death if they did not stop the use of these medications/substances.     The risks, benefits, potential complications and alternative therapies were discussed at great lengths as outlined in our extensive consent forms, online consent, and educational teaching processes.      The patient has confirmed participation in the program's extensive educational activities.      All questions and concerns were answered to the patient's satisfaction.  The patient now wishes to proceed with surgery.    The patient agreed to a postoperative course of anticoagulant therapy.    I instructed patient that the surgery could be laparoscopically and/or robotically.    I instructed patient to start on a H2 blocker or proton pump inhibitor if not already on one of these medications.    I explained in detail the procedures that are in the consent.  All of these procedures have a chance to convert to open if any technical challenges or complications do occur.  Bariatric surgery is not cosmetic surgery but rather a tool to help a patient make a life-long commitment lifestyle change including diet, exercise, behavior changes, and taking supplemental  vitamins and minerals.    Problems after surgery may require more operations to correct them.    The risks, benefits, alternatives, and potential complications of all of the procedures were explained in detail including, but not limited to death, anesthesia and medication adverse effect, deep venous thrombosis, pulmonary embolism, trocar site/incisional hernia, wound infection, abdominal infection, bleeding, failure to lose weight, gain weight, a change in body image, metabolic complications with vitamin deficiences and anemia.    Weight loss expectations were discussed with the patient in detail. The weight loss operations most commonly performed are the sleeve gastrectomy and the Divya-en-Y gastric bypass. These operations result in weight losses up to approximately 25-35% of initial body weight 12 to 24 months after surgery with the gastric bypass usually the higher percent of weight loss but depends on patient using the tool.    For the gastric bypass and loop duodenal switch (SEEMA-S) the risks include but not limited to the following early complications:  Anastomotic leak/peritonitis, Divya/Alimentary/biliopancreatic limb obstruction, severe & minor wound infection/seroma, and nausea/vomiting.  Late complications can include but are not limited to malnutrition, vitamin deficiencies, frequent loose stools,  stomal stenosis, marginal ulcer, bowel obstruction, intussusception, internal, and incisional hernia.    Regarding the gastric sleeve, there is higher risk of dysphagia and reflux leading to possible Ware's esophagus compared to a gastric bypass, as well as risk of internal visceral/organ injury, splenectomy, bleeding, infection, leak (which could require further intervention possible conversion to Divya-en-Y gastric bypass), stenosis and possibility of regaining weight.    Sravanthi was counseled regarding diagnostic results, instructions for management, risk factor reductions, prognosis, patient and family  education, impressions, risks and benefits of treatment options and importance of compliance with treatment. Total time of the encounter was over 45 minutes counseling the patient regarding the procedure as above and reviewing as well as ordering labs, medications and the procedure.  The chart was also reviewed prior to seeing the patient reviewing previous testing, studies and labs.    Sravanthi understands the surgical procedures and the different surgical options that are available.  She understands the lifestyle changes that are required after surgery and has agreed to follow the guidelines outlined in the weight management program.  She also expressed understanding of the risks involved and had all of female questions answered and desires to proceed.      Vinicio Arango MD  8/5/2022

## 2022-08-05 NOTE — PATIENT INSTRUCTIONS
Bariatric Manual    You were provided a manual specific to the procedure that you have chosen.  Please refer to that with any questions or call the office at [phone]

## 2022-09-08 ENCOUNTER — ANESTHESIA EVENT (OUTPATIENT)
Dept: PERIOP | Facility: HOSPITAL | Age: 42
End: 2022-09-08

## 2022-09-09 ENCOUNTER — PRE-ADMISSION TESTING (OUTPATIENT)
Dept: PREADMISSION TESTING | Facility: HOSPITAL | Age: 42
End: 2022-09-09

## 2022-09-09 VITALS
RESPIRATION RATE: 16 BRPM | DIASTOLIC BLOOD PRESSURE: 88 MMHG | HEIGHT: 63 IN | HEART RATE: 72 BPM | SYSTOLIC BLOOD PRESSURE: 125 MMHG | OXYGEN SATURATION: 99 % | TEMPERATURE: 98.4 F | BODY MASS INDEX: 46.41 KG/M2

## 2022-09-09 DIAGNOSIS — E66.01 OBESITY, CLASS III, BMI 40-49.9 (MORBID OBESITY): ICD-10-CM

## 2022-09-09 LAB
ALBUMIN SERPL-MCNC: 3.6 G/DL (ref 3.5–5.2)
ALBUMIN/GLOB SERPL: 1 G/DL
ALP SERPL-CCNC: 61 U/L (ref 39–117)
ALT SERPL W P-5'-P-CCNC: 80 U/L (ref 1–33)
ANION GAP SERPL CALCULATED.3IONS-SCNC: 8.2 MMOL/L (ref 5–15)
AST SERPL-CCNC: 48 U/L (ref 1–32)
BILIRUB SERPL-MCNC: 0.3 MG/DL (ref 0–1.2)
BUN SERPL-MCNC: 16 MG/DL (ref 6–20)
BUN/CREAT SERPL: 20.8 (ref 7–25)
CALCIUM SPEC-SCNC: 9.4 MG/DL (ref 8.6–10.5)
CHLORIDE SERPL-SCNC: 103 MMOL/L (ref 98–107)
CO2 SERPL-SCNC: 25.8 MMOL/L (ref 22–29)
CREAT SERPL-MCNC: 0.77 MG/DL (ref 0.57–1)
DEPRECATED RDW RBC AUTO: 50.7 FL (ref 37–54)
EGFRCR SERPLBLD CKD-EPI 2021: 98.3 ML/MIN/1.73
ERYTHROCYTE [DISTWIDTH] IN BLOOD BY AUTOMATED COUNT: 14.7 % (ref 12.3–15.4)
GLOBULIN UR ELPH-MCNC: 3.5 GM/DL
GLUCOSE SERPL-MCNC: 108 MG/DL (ref 65–99)
HCG SERPL QL: NEGATIVE
HCT VFR BLD AUTO: 36.8 % (ref 34–46.6)
HGB BLD-MCNC: 11.7 G/DL (ref 12–15.9)
MCH RBC QN AUTO: 29.9 PG (ref 26.6–33)
MCHC RBC AUTO-ENTMCNC: 31.8 G/DL (ref 31.5–35.7)
MCV RBC AUTO: 94.1 FL (ref 79–97)
PLATELET # BLD AUTO: 154 10*3/MM3 (ref 140–450)
PMV BLD AUTO: 11.3 FL (ref 6–12)
POTASSIUM SERPL-SCNC: 4 MMOL/L (ref 3.5–5.2)
PROT SERPL-MCNC: 7.1 G/DL (ref 6–8.5)
RBC # BLD AUTO: 3.91 10*6/MM3 (ref 3.77–5.28)
SODIUM SERPL-SCNC: 137 MMOL/L (ref 136–145)
WBC NRBC COR # BLD: 4.65 10*3/MM3 (ref 3.4–10.8)

## 2022-09-09 PROCEDURE — 85027 COMPLETE CBC AUTOMATED: CPT

## 2022-09-09 PROCEDURE — 84703 CHORIONIC GONADOTROPIN ASSAY: CPT

## 2022-09-09 PROCEDURE — 80053 COMPREHEN METABOLIC PANEL: CPT

## 2022-09-09 PROCEDURE — 36415 COLL VENOUS BLD VENIPUNCTURE: CPT

## 2022-09-09 RX ORDER — METHOCARBAMOL 750 MG/1
750 TABLET, FILM COATED ORAL 4 TIMES DAILY PRN
COMMUNITY
Start: 2022-08-11 | End: 2023-01-17

## 2022-09-09 RX ORDER — ACETAMINOPHEN 10 MG/ML
1000 INJECTION, SOLUTION INTRAVENOUS ONCE
Status: CANCELLED | OUTPATIENT
Start: 2022-09-12

## 2022-09-09 RX ORDER — MULTIPLE VITAMINS W/ MINERALS TAB 9MG-400MCG
1 TAB ORAL DAILY
COMMUNITY

## 2022-09-09 NOTE — DISCHARGE INSTRUCTIONS
Take only the following medications the morning of surgery:   NONE    ARRIVE 6:30 AM  9/12      Do not take Bariatric Vitamins, Folic Acid, Actigall (if applicable) or Lovenox Injections (if applicable) the morning of surgery.  If you have a history of blood clots or have a BMI greater than 50, Dr. Arango may order Lovenox for after surgery. Do not take Lovenox blood thinner before surgery.      General Instructions:   Drink one 20 ounce Gatorade G2 the evening before surgery.  Nothing red in color.  Do not eat solid food after midnight the night before surgery.    The morning of surgery have another 20 ounce Gatorade G2.  Again, nothing red in color.  Your drink must be completed 2 hours before your arrival time.   Patients who avoid smoking, chewing tobacco and alcohol for 4 weeks prior to surgery have a reduced risk of post-operative complications.  Quit smoking as many days before surgery as you can.  Do not smoke, use chewing tobacco or drink alcohol the day of surgery.   Bring any papers given to you in the doctor's office.  Wear clean comfortable clothes.  Do not wear contact lenses, false eyelashes or make-up.  Bring a case for your glasses.   Bring crutches or walker if applicable.  Remove all piercings.  Leave jewelry and any other valuables at home.  Remove fingernail polish, gel overlays or any artificial nails.  Hair extensions with metal clips must be removed prior to surgery.  The Pre-Admission Testing nurse will instruct you to bring medications if unable to obtain an accurate list in Pre-Admission Testing.        Preventing a Surgical Site Infection:  For 2 to 3 days before surgery, avoid shaving with a razor because the razor can irritate skin and make it easier to develop an infection.    Any areas of open skin can increase the risk of a post-operative wound infection by allowing bacteria to enter and travel throughout the body.  Notify your surgeon if you have any skin wounds / rashes even if it  is not near the expected surgical site.  The area will need assessed to determine if surgery should be delayed until it is healed.  2 days prior to surgery, take a shower using a fresh bar of anti-bacterial soap (such as Dial).  Use a clean washcloth and dry with a clean towel.    The day prior to surgery, take a shower using a fresh bar of anti-bacterial soap (such as Dial).  Use a clean washcloth and dry with a clean towel.  Sleep in a clean bed with clean clothing.  Do not allow pets to sleep with you.  The morning of surgery shower using a fresh bar of anti-bacterial soap (such as Dial).  Use a clean washcloth and dry with a clean towel.  Follow the Chlorhexidine instructions below.    CHLORHEXIDINE CLOTH INSTRUCTIONS  The morning of surgery follow these instructions using the Chlorhexidine cloths you've been given.  These steps reduce bacteria on the body.  Do not use the cloths near your eyes, ears mouth, genitalia or on open wounds.  Throw the cloths away after use but do not try to flush them down a toilet.    Open and remove one cloth at a time from the package.    Leave the cloth unfolded and begin the bathing.  Massage the skin with the cloths using gentle pressure to remove bacteria.  Do not scrub harshly.   Follow the steps below with one 2% CHG cloth per area (6 total cloths).  One cloth for neck, shoulders and chest.  One cloth for both arms, hands, fingers and underarms (do underarms last).  One cloth for the abdomen followed by groin.  One cloth for right leg and foot including between the toes.  One cloth for left leg and foot including between the toes.  The last cloth is to be used for the back of the neck, back and buttocks.    Allow the CHG to air dry 3 minutes on the skin which will give it time to work and decrease the chance of irritation.  The skin may feel sticky until it is dry.  Do not rinse with water or any other liquid or you will lose the beneficial effects of the CHG.  If mild skin  irritation occurs, do rinse the skin to remove the CHG.  Report this to the nurse at time of admission.  Do not apply lotions, creams, ointments, deodorants or perfumes after using the clothes. Dress in clean clothes before coming to the hospital.    Ask your surgeon if you will be receiving antibiotics prior to surgery.  Make sure you, your family, and all healthcare providers clean their hands with soap and water or an alcohol based hand  before caring for you or your wound.      Day of surgery:  Your arrival time is approximately two hours before your scheduled surgery time.  Upon arrival, a Pre-op nurse and Anesthesiologist will review your health history, obtain vital signs, and answer questions you may have.  A Pre-op nurse will start an IV and you may receive medication in preparation for surgery, including something to help you relax.  If applicable, we do ask that you have your C-PAP/BI-PAP machine available. It can be utilized the night of surgery.     Please be aware that surgery does come with discomfort.  We want to make every effort to control your discomfort so please discuss any uncontrolled symptoms with your nurse.   Your doctor will most likely have prescribed pain medications.      If you are going home after surgery you will receive individualized written care instructions before being discharged.  A responsible adult must drive you to and from the hospital on the day of your surgery and stay with you for 24 hours.  Discharge prescriptions can be filled by the hospital pharmacy during regular pharmacy hours.  If you are having surgery late in the day/evening your prescription may be e-prescribed to your pharmacy.  Please verify your pharmacy hours or chose a 24 hour pharmacy to avoid not having access to your prescription because your pharmacy has closed for the day.    If you are staying overnight following surgery, you will be transported to your hospital room following the recovery  period.  [facility] has all private rooms.    If you have any questions please call Pre-Admission Testing at [phone].  Deductibles and co-payments are collected on the day of service. Please be prepared to pay the required co-pay, deductible or deposit on the day of service as defined by your plan.    Call your surgeon immediately if you experience any of the following symptoms:  Sore Throat  Shortness of Breath or difficulty breathing  Cough  Chills  Body soreness or muscle pain  Headache  Fever  New loss of taste or smell  Do not arrive for your surgery ill.  Your procedure will need to be rescheduled to another time.  You will need to call your physician before the day of surgery to avoid any unnecessary exposure to hospital staff as well as other patients.

## 2022-09-12 ENCOUNTER — HOSPITAL ENCOUNTER (INPATIENT)
Facility: HOSPITAL | Age: 42
LOS: 1 days | Discharge: HOME | End: 2022-09-13
Attending: SURGERY | Admitting: SURGERY

## 2022-09-12 ENCOUNTER — ANESTHESIA (OUTPATIENT)
Dept: PERIOP | Facility: HOSPITAL | Age: 42
End: 2022-09-12

## 2022-09-12 DIAGNOSIS — E66.01 OBESITY, CLASS III, BMI 40-49.9 (MORBID OBESITY): ICD-10-CM

## 2022-09-12 PROCEDURE — 25010000002 KETOROLAC TROMETHAMINE PER 15 MG: Performed by: SURGERY

## 2022-09-12 PROCEDURE — 25010000002 EPINEPHRINE 1 MG/ML SOLUTION 30 ML VIAL: Performed by: SURGERY

## 2022-09-12 PROCEDURE — 25010000002 FENTANYL CITRATE (PF) 50 MCG/ML SOLUTION: Performed by: NURSE ANESTHETIST, CERTIFIED REGISTERED

## 2022-09-12 PROCEDURE — 88307 TISSUE EXAM BY PATHOLOGIST: CPT | Performed by: SURGERY

## 2022-09-12 PROCEDURE — 25010000002 CEFAZOLIN PER 500 MG: Performed by: SURGERY

## 2022-09-12 PROCEDURE — 0DB64ZZ EXCISION OF STOMACH, PERCUTANEOUS ENDOSCOPIC APPROACH: ICD-10-PCS | Performed by: SURGERY

## 2022-09-12 PROCEDURE — 43644 LAP GASTRIC BYPASS/ROUX-EN-Y: CPT | Performed by: SURGERY

## 2022-09-12 PROCEDURE — 25010000002 DEXAMETHASONE PER 1 MG: Performed by: NURSE ANESTHETIST, CERTIFIED REGISTERED

## 2022-09-12 PROCEDURE — 43644 LAP GASTRIC BYPASS/ROUX-EN-Y: CPT | Performed by: NURSE PRACTITIONER

## 2022-09-12 PROCEDURE — 25010000002 METOCLOPRAMIDE PER 10 MG: Performed by: SURGERY

## 2022-09-12 PROCEDURE — 25010000002 PROPOFOL 10 MG/ML EMULSION: Performed by: NURSE ANESTHETIST, CERTIFIED REGISTERED

## 2022-09-12 PROCEDURE — 0D164ZA BYPASS STOMACH TO JEJUNUM, PERCUTANEOUS ENDOSCOPIC APPROACH: ICD-10-PCS | Performed by: SURGERY

## 2022-09-12 PROCEDURE — 0DN64ZZ RELEASE STOMACH, PERCUTANEOUS ENDOSCOPIC APPROACH: ICD-10-PCS | Performed by: SURGERY

## 2022-09-12 PROCEDURE — C1889 IMPLANT/INSERT DEVICE, NOC: HCPCS | Performed by: SURGERY

## 2022-09-12 PROCEDURE — 25010000002 ONDANSETRON PER 1 MG: Performed by: NURSE ANESTHETIST, CERTIFIED REGISTERED

## 2022-09-12 PROCEDURE — 25010000002 MAGNESIUM SULFATE PER 500 MG OF MAGNESIUM: Performed by: NURSE ANESTHETIST, CERTIFIED REGISTERED

## 2022-09-12 PROCEDURE — 25010000002 ROPIVACAINE PER 1 MG: Performed by: SURGERY

## 2022-09-12 PROCEDURE — 25010000002 CLONIDINE PER 1 MG: Performed by: SURGERY

## 2022-09-12 DEVICE — STPL/LN REINF PERISTRIP DRY VERITAS THN: Type: IMPLANTABLE DEVICE | Site: ABDOMEN | Status: FUNCTIONAL

## 2022-09-12 DEVICE — SEALANT WND FIBRIN TISSEEL PREFIL/SYR/PRIMAFZ 4ML: Type: IMPLANTABLE DEVICE | Site: ABDOMEN | Status: FUNCTIONAL

## 2022-09-12 DEVICE — ENDOPATH ECHELON ENDOSCOPIC LINEAR CUTTER RELOADS, GREEN, 60MM
Type: IMPLANTABLE DEVICE | Site: ABDOMEN | Status: FUNCTIONAL
Brand: ECHELON ENDOPATH

## 2022-09-12 DEVICE — ENDOPATH ECHELON ENDOSCOPIC LINEAR CUTTER RELOADS, GOLD, 60MM
Type: IMPLANTABLE DEVICE | Site: ABDOMEN | Status: FUNCTIONAL
Brand: ECHELON ENDOPATH

## 2022-09-12 DEVICE — ENDOPATH ECHELON ENDOSCOPIC LINEAR CUTTER RELOADS, BLUE, 60MM
Type: IMPLANTABLE DEVICE | Site: ABDOMEN | Status: FUNCTIONAL
Brand: ECHELON ENDOPATH

## 2022-09-12 DEVICE — ENDOPATH ECHELON ENDOSCOPIC LINEAR CUTTER RELOADS, WHITE, 60MM
Type: IMPLANTABLE DEVICE | Site: ABDOMEN | Status: FUNCTIONAL
Brand: ECHELON ENDOPATH

## 2022-09-12 RX ORDER — KETAMINE HYDROCHLORIDE 10 MG/ML
INJECTION INTRAMUSCULAR; INTRAVENOUS AS NEEDED
Status: DISCONTINUED | OUTPATIENT
Start: 2022-09-12 | End: 2022-09-12 | Stop reason: SURG

## 2022-09-12 RX ORDER — CYANOCOBALAMIN 1000 UG/ML
1000 INJECTION, SOLUTION INTRAMUSCULAR; SUBCUTANEOUS ONCE
Status: COMPLETED | OUTPATIENT
Start: 2022-09-13 | End: 2022-09-13

## 2022-09-12 RX ORDER — FENTANYL CITRATE 50 UG/ML
50 INJECTION, SOLUTION INTRAMUSCULAR; INTRAVENOUS
Status: DISCONTINUED | OUTPATIENT
Start: 2022-09-12 | End: 2022-09-12 | Stop reason: HOSPADM

## 2022-09-12 RX ORDER — SODIUM CHLORIDE 0.9 % (FLUSH) 0.9 %
3 SYRINGE (ML) INJECTION EVERY 12 HOURS SCHEDULED
Status: DISCONTINUED | OUTPATIENT
Start: 2022-09-12 | End: 2022-09-12 | Stop reason: HOSPADM

## 2022-09-12 RX ORDER — PROCHLORPERAZINE EDISYLATE 5 MG/ML
10 INJECTION INTRAMUSCULAR; INTRAVENOUS EVERY 6 HOURS PRN
Status: DISCONTINUED | OUTPATIENT
Start: 2022-09-12 | End: 2022-09-13 | Stop reason: HOSPADM

## 2022-09-12 RX ORDER — SODIUM CHLORIDE, SODIUM LACTATE, POTASSIUM CHLORIDE, CALCIUM CHLORIDE 600; 310; 30; 20 MG/100ML; MG/100ML; MG/100ML; MG/100ML
100 INJECTION, SOLUTION INTRAVENOUS CONTINUOUS
Status: DISCONTINUED | OUTPATIENT
Start: 2022-09-12 | End: 2022-09-12

## 2022-09-12 RX ORDER — CEFAZOLIN SODIUM IN 0.9 % NACL 3 G/100 ML
3 INTRAVENOUS SOLUTION, PIGGYBACK (ML) INTRAVENOUS
Status: COMPLETED | OUTPATIENT
Start: 2022-09-12 | End: 2022-09-12

## 2022-09-12 RX ORDER — DIPHENHYDRAMINE HCL 25 MG
25 CAPSULE ORAL
Status: DISCONTINUED | OUTPATIENT
Start: 2022-09-12 | End: 2022-09-12 | Stop reason: HOSPADM

## 2022-09-12 RX ORDER — GABAPENTIN 250 MG/5ML
300 SOLUTION ORAL EVERY 8 HOURS
Status: DISCONTINUED | OUTPATIENT
Start: 2022-09-12 | End: 2022-09-13 | Stop reason: HOSPADM

## 2022-09-12 RX ORDER — SODIUM CHLORIDE, SODIUM LACTATE, POTASSIUM CHLORIDE, CALCIUM CHLORIDE 600; 310; 30; 20 MG/100ML; MG/100ML; MG/100ML; MG/100ML
9 INJECTION, SOLUTION INTRAVENOUS CONTINUOUS
Status: DISCONTINUED | OUTPATIENT
Start: 2022-09-12 | End: 2022-09-12

## 2022-09-12 RX ORDER — HYDRALAZINE HYDROCHLORIDE 20 MG/ML
5 INJECTION INTRAMUSCULAR; INTRAVENOUS
Status: DISCONTINUED | OUTPATIENT
Start: 2022-09-12 | End: 2022-09-12 | Stop reason: HOSPADM

## 2022-09-12 RX ORDER — NALOXONE HCL 0.4 MG/ML
0.2 VIAL (ML) INJECTION AS NEEDED
Status: DISCONTINUED | OUTPATIENT
Start: 2022-09-12 | End: 2022-09-12 | Stop reason: HOSPADM

## 2022-09-12 RX ORDER — DEXAMETHASONE SODIUM PHOSPHATE 4 MG/ML
INJECTION, SOLUTION INTRA-ARTICULAR; INTRALESIONAL; INTRAMUSCULAR; INTRAVENOUS; SOFT TISSUE AS NEEDED
Status: DISCONTINUED | OUTPATIENT
Start: 2022-09-12 | End: 2022-09-12 | Stop reason: SURG

## 2022-09-12 RX ORDER — SCOLOPAMINE TRANSDERMAL SYSTEM 1 MG/1
1 PATCH, EXTENDED RELEASE TRANSDERMAL CONTINUOUS
Status: DISCONTINUED | OUTPATIENT
Start: 2022-09-12 | End: 2022-09-13 | Stop reason: HOSPADM

## 2022-09-12 RX ORDER — SODIUM CHLORIDE 9 MG/ML
INJECTION, SOLUTION INTRAVENOUS AS NEEDED
Status: DISCONTINUED | OUTPATIENT
Start: 2022-09-12 | End: 2022-09-12 | Stop reason: HOSPADM

## 2022-09-12 RX ORDER — ONDANSETRON 4 MG/1
4 TABLET, FILM COATED ORAL EVERY 4 HOURS PRN
Status: DISCONTINUED | OUTPATIENT
Start: 2022-09-12 | End: 2022-09-13 | Stop reason: HOSPADM

## 2022-09-12 RX ORDER — HYDROMORPHONE HYDROCHLORIDE 2 MG/1
2 TABLET ORAL EVERY 4 HOURS PRN
Status: DISCONTINUED | OUTPATIENT
Start: 2022-09-12 | End: 2022-09-13 | Stop reason: HOSPADM

## 2022-09-12 RX ORDER — MIDAZOLAM HYDROCHLORIDE 1 MG/ML
1 INJECTION INTRAMUSCULAR; INTRAVENOUS
Status: DISCONTINUED | OUTPATIENT
Start: 2022-09-12 | End: 2022-09-12 | Stop reason: HOSPADM

## 2022-09-12 RX ORDER — PROMETHAZINE HYDROCHLORIDE 25 MG/1
12.5 TABLET ORAL ONCE
Status: DISCONTINUED | OUTPATIENT
Start: 2022-09-12 | End: 2022-09-12 | Stop reason: HOSPADM

## 2022-09-12 RX ORDER — METOCLOPRAMIDE HYDROCHLORIDE 5 MG/ML
10 INJECTION INTRAMUSCULAR; INTRAVENOUS ONCE
Status: COMPLETED | OUTPATIENT
Start: 2022-09-12 | End: 2022-09-12

## 2022-09-12 RX ORDER — MAGNESIUM SULFATE HEPTAHYDRATE 500 MG/ML
INJECTION, SOLUTION INTRAMUSCULAR; INTRAVENOUS AS NEEDED
Status: DISCONTINUED | OUTPATIENT
Start: 2022-09-12 | End: 2022-09-12 | Stop reason: SURG

## 2022-09-12 RX ORDER — PANTOPRAZOLE SODIUM 40 MG/10ML
40 INJECTION, POWDER, LYOPHILIZED, FOR SOLUTION INTRAVENOUS ONCE
Status: COMPLETED | OUTPATIENT
Start: 2022-09-12 | End: 2022-09-12

## 2022-09-12 RX ORDER — ACETAMINOPHEN 160 MG/5ML
975 SOLUTION ORAL EVERY 6 HOURS
Status: DISCONTINUED | OUTPATIENT
Start: 2022-09-12 | End: 2022-09-13 | Stop reason: HOSPADM

## 2022-09-12 RX ORDER — ALBUTEROL SULFATE 2.5 MG/3ML
2.5 SOLUTION RESPIRATORY (INHALATION) EVERY 4 HOURS PRN
Status: DISCONTINUED | OUTPATIENT
Start: 2022-09-12 | End: 2022-09-13 | Stop reason: HOSPADM

## 2022-09-12 RX ORDER — MIRTAZAPINE 15 MG/1
15 TABLET, ORALLY DISINTEGRATING ORAL NIGHTLY
Status: DISCONTINUED | OUTPATIENT
Start: 2022-09-12 | End: 2022-09-13 | Stop reason: HOSPADM

## 2022-09-12 RX ORDER — PROMETHAZINE HYDROCHLORIDE 25 MG/1
25 SUPPOSITORY RECTAL ONCE
Status: DISCONTINUED | OUTPATIENT
Start: 2022-09-12 | End: 2022-09-12 | Stop reason: HOSPADM

## 2022-09-12 RX ORDER — SODIUM CHLORIDE 0.9 % (FLUSH) 0.9 %
3-10 SYRINGE (ML) INJECTION AS NEEDED
Status: DISCONTINUED | OUTPATIENT
Start: 2022-09-12 | End: 2022-09-12 | Stop reason: HOSPADM

## 2022-09-12 RX ORDER — HYDROCODONE BITARTRATE AND ACETAMINOPHEN 5; 325 MG/1; MG/1
1 TABLET ORAL ONCE AS NEEDED
Status: DISCONTINUED | OUTPATIENT
Start: 2022-09-12 | End: 2022-09-12 | Stop reason: HOSPADM

## 2022-09-12 RX ORDER — HYDROMORPHONE HYDROCHLORIDE 1 MG/ML
0.5 INJECTION, SOLUTION INTRAMUSCULAR; INTRAVENOUS; SUBCUTANEOUS
Status: DISCONTINUED | OUTPATIENT
Start: 2022-09-12 | End: 2022-09-12 | Stop reason: HOSPADM

## 2022-09-12 RX ORDER — NALOXONE HCL 0.4 MG/ML
0.1 VIAL (ML) INJECTION
Status: DISCONTINUED | OUTPATIENT
Start: 2022-09-12 | End: 2022-09-13 | Stop reason: HOSPADM

## 2022-09-12 RX ORDER — SODIUM CHLORIDE 0.9 % (FLUSH) 0.9 %
10 SYRINGE (ML) INJECTION EVERY 12 HOURS SCHEDULED
Status: DISCONTINUED | OUTPATIENT
Start: 2022-09-12 | End: 2022-09-12 | Stop reason: HOSPADM

## 2022-09-12 RX ORDER — MAGNESIUM HYDROXIDE 1200 MG/15ML
LIQUID ORAL AS NEEDED
Status: DISCONTINUED | OUTPATIENT
Start: 2022-09-12 | End: 2022-09-12 | Stop reason: HOSPADM

## 2022-09-12 RX ORDER — PROPOFOL 10 MG/ML
VIAL (ML) INTRAVENOUS AS NEEDED
Status: DISCONTINUED | OUTPATIENT
Start: 2022-09-12 | End: 2022-09-12 | Stop reason: SURG

## 2022-09-12 RX ORDER — FLUMAZENIL 0.1 MG/ML
0.2 INJECTION INTRAVENOUS AS NEEDED
Status: DISCONTINUED | OUTPATIENT
Start: 2022-09-12 | End: 2022-09-12 | Stop reason: HOSPADM

## 2022-09-12 RX ORDER — HYDROCODONE BITARTRATE AND ACETAMINOPHEN 7.5; 325 MG/1; MG/1
2 TABLET ORAL EVERY 4 HOURS PRN
Status: DISCONTINUED | OUTPATIENT
Start: 2022-09-12 | End: 2022-09-12 | Stop reason: HOSPADM

## 2022-09-12 RX ORDER — METOCLOPRAMIDE HYDROCHLORIDE 5 MG/ML
10 INJECTION INTRAMUSCULAR; INTRAVENOUS EVERY 6 HOURS
Status: DISCONTINUED | OUTPATIENT
Start: 2022-09-12 | End: 2022-09-12

## 2022-09-12 RX ORDER — ACETAMINOPHEN 500 MG
1000 TABLET ORAL EVERY 6 HOURS
Status: DISCONTINUED | OUTPATIENT
Start: 2022-09-12 | End: 2022-09-13 | Stop reason: HOSPADM

## 2022-09-12 RX ORDER — DIPHENHYDRAMINE HYDROCHLORIDE 50 MG/ML
25 INJECTION INTRAMUSCULAR; INTRAVENOUS EVERY 4 HOURS PRN
Status: DISCONTINUED | OUTPATIENT
Start: 2022-09-12 | End: 2022-09-13 | Stop reason: HOSPADM

## 2022-09-12 RX ORDER — PROMETHAZINE HYDROCHLORIDE 12.5 MG/1
12.5 SUPPOSITORY RECTAL EVERY 4 HOURS PRN
Status: DISCONTINUED | OUTPATIENT
Start: 2022-09-12 | End: 2022-09-13 | Stop reason: HOSPADM

## 2022-09-12 RX ORDER — SODIUM CHLORIDE, SODIUM LACTATE, POTASSIUM CHLORIDE, CALCIUM CHLORIDE 600; 310; 30; 20 MG/100ML; MG/100ML; MG/100ML; MG/100ML
150 INJECTION, SOLUTION INTRAVENOUS CONTINUOUS
Status: DISCONTINUED | OUTPATIENT
Start: 2022-09-12 | End: 2022-09-13 | Stop reason: HOSPADM

## 2022-09-12 RX ORDER — LIDOCAINE HYDROCHLORIDE 10 MG/ML
0.5 INJECTION, SOLUTION EPIDURAL; INFILTRATION; INTRACAUDAL; PERINEURAL ONCE AS NEEDED
Status: DISCONTINUED | OUTPATIENT
Start: 2022-09-12 | End: 2022-09-12 | Stop reason: HOSPADM

## 2022-09-12 RX ORDER — EPHEDRINE SULFATE 50 MG/ML
5 INJECTION, SOLUTION INTRAVENOUS ONCE AS NEEDED
Status: DISCONTINUED | OUTPATIENT
Start: 2022-09-12 | End: 2022-09-12 | Stop reason: HOSPADM

## 2022-09-12 RX ORDER — ONDANSETRON 2 MG/ML
4 INJECTION INTRAMUSCULAR; INTRAVENOUS ONCE AS NEEDED
Status: DISCONTINUED | OUTPATIENT
Start: 2022-09-12 | End: 2022-09-12 | Stop reason: HOSPADM

## 2022-09-12 RX ORDER — GLYCOPYRROLATE 0.2 MG/ML
INJECTION INTRAMUSCULAR; INTRAVENOUS AS NEEDED
Status: DISCONTINUED | OUTPATIENT
Start: 2022-09-12 | End: 2022-09-12 | Stop reason: SURG

## 2022-09-12 RX ORDER — ONDANSETRON 4 MG/1
4 TABLET, ORALLY DISINTEGRATING ORAL EVERY 4 HOURS PRN
Status: DISCONTINUED | OUTPATIENT
Start: 2022-09-12 | End: 2022-09-13 | Stop reason: HOSPADM

## 2022-09-12 RX ORDER — PROMETHAZINE HYDROCHLORIDE 25 MG/1
25 SUPPOSITORY RECTAL ONCE AS NEEDED
Status: DISCONTINUED | OUTPATIENT
Start: 2022-09-12 | End: 2022-09-12 | Stop reason: HOSPADM

## 2022-09-12 RX ORDER — ROCURONIUM BROMIDE 10 MG/ML
INJECTION, SOLUTION INTRAVENOUS AS NEEDED
Status: DISCONTINUED | OUTPATIENT
Start: 2022-09-12 | End: 2022-09-12 | Stop reason: SURG

## 2022-09-12 RX ORDER — ACETAMINOPHEN 10 MG/ML
INJECTION, SOLUTION INTRAVENOUS AS NEEDED
Status: DISCONTINUED | OUTPATIENT
Start: 2022-09-12 | End: 2022-09-12 | Stop reason: SURG

## 2022-09-12 RX ORDER — FENTANYL CITRATE 50 UG/ML
INJECTION, SOLUTION INTRAMUSCULAR; INTRAVENOUS AS NEEDED
Status: DISCONTINUED | OUTPATIENT
Start: 2022-09-12 | End: 2022-09-12 | Stop reason: SURG

## 2022-09-12 RX ORDER — ENOXAPARIN SODIUM 100 MG/ML
40 INJECTION SUBCUTANEOUS ONCE
Status: COMPLETED | OUTPATIENT
Start: 2022-09-13 | End: 2022-09-13

## 2022-09-12 RX ORDER — LABETALOL HYDROCHLORIDE 5 MG/ML
5 INJECTION, SOLUTION INTRAVENOUS
Status: DISCONTINUED | OUTPATIENT
Start: 2022-09-12 | End: 2022-09-12 | Stop reason: HOSPADM

## 2022-09-12 RX ORDER — SODIUM CHLORIDE 0.9 % (FLUSH) 0.9 %
1-10 SYRINGE (ML) INJECTION AS NEEDED
Status: DISCONTINUED | OUTPATIENT
Start: 2022-09-12 | End: 2022-09-12 | Stop reason: HOSPADM

## 2022-09-12 RX ORDER — GABAPENTIN 300 MG/1
300 CAPSULE ORAL EVERY 8 HOURS
Status: DISCONTINUED | OUTPATIENT
Start: 2022-09-12 | End: 2022-09-13 | Stop reason: HOSPADM

## 2022-09-12 RX ORDER — PROMETHAZINE HYDROCHLORIDE 12.5 MG/1
12.5 TABLET ORAL EVERY 4 HOURS PRN
Status: DISCONTINUED | OUTPATIENT
Start: 2022-09-12 | End: 2022-09-13 | Stop reason: HOSPADM

## 2022-09-12 RX ORDER — HYDRALAZINE HYDROCHLORIDE 20 MG/ML
10 INJECTION INTRAMUSCULAR; INTRAVENOUS
Status: DISCONTINUED | OUTPATIENT
Start: 2022-09-12 | End: 2022-09-13 | Stop reason: HOSPADM

## 2022-09-12 RX ORDER — HYDROMORPHONE HYDROCHLORIDE 1 MG/ML
0.5 INJECTION, SOLUTION INTRAMUSCULAR; INTRAVENOUS; SUBCUTANEOUS
Status: DISCONTINUED | OUTPATIENT
Start: 2022-09-12 | End: 2022-09-13 | Stop reason: HOSPADM

## 2022-09-12 RX ORDER — PROMETHAZINE HYDROCHLORIDE 25 MG/1
25 TABLET ORAL ONCE AS NEEDED
Status: DISCONTINUED | OUTPATIENT
Start: 2022-09-12 | End: 2022-09-12 | Stop reason: HOSPADM

## 2022-09-12 RX ORDER — GABAPENTIN 250 MG/5ML
300 SOLUTION ORAL ONCE
Status: COMPLETED | OUTPATIENT
Start: 2022-09-12 | End: 2022-09-12

## 2022-09-12 RX ORDER — DIPHENHYDRAMINE HYDROCHLORIDE 50 MG/ML
12.5 INJECTION INTRAMUSCULAR; INTRAVENOUS
Status: DISCONTINUED | OUTPATIENT
Start: 2022-09-12 | End: 2022-09-12 | Stop reason: HOSPADM

## 2022-09-12 RX ORDER — LORAZEPAM 1 MG/1
1 TABLET ORAL EVERY 12 HOURS PRN
Status: DISCONTINUED | OUTPATIENT
Start: 2022-09-12 | End: 2022-09-13 | Stop reason: HOSPADM

## 2022-09-12 RX ORDER — LIDOCAINE HYDROCHLORIDE 20 MG/ML
INJECTION, SOLUTION INFILTRATION; PERINEURAL AS NEEDED
Status: DISCONTINUED | OUTPATIENT
Start: 2022-09-12 | End: 2022-09-12 | Stop reason: SURG

## 2022-09-12 RX ORDER — METOCLOPRAMIDE HYDROCHLORIDE 5 MG/ML
10 INJECTION INTRAMUSCULAR; INTRAVENOUS EVERY 6 HOURS
Status: DISCONTINUED | OUTPATIENT
Start: 2022-09-12 | End: 2022-09-13 | Stop reason: HOSPADM

## 2022-09-12 RX ORDER — CHLORHEXIDINE GLUCONATE 0.12 MG/ML
15 RINSE ORAL SEE ADMIN INSTRUCTIONS
Status: COMPLETED | OUTPATIENT
Start: 2022-09-12 | End: 2022-09-12

## 2022-09-12 RX ORDER — ONDANSETRON 2 MG/ML
4 INJECTION INTRAMUSCULAR; INTRAVENOUS EVERY 4 HOURS PRN
Status: DISCONTINUED | OUTPATIENT
Start: 2022-09-12 | End: 2022-09-13 | Stop reason: HOSPADM

## 2022-09-12 RX ORDER — ONDANSETRON 2 MG/ML
INJECTION INTRAMUSCULAR; INTRAVENOUS AS NEEDED
Status: DISCONTINUED | OUTPATIENT
Start: 2022-09-12 | End: 2022-09-12 | Stop reason: SURG

## 2022-09-12 RX ORDER — FAMOTIDINE 10 MG/ML
20 INJECTION, SOLUTION INTRAVENOUS EVERY 12 HOURS SCHEDULED
Status: DISCONTINUED | OUTPATIENT
Start: 2022-09-12 | End: 2022-09-13 | Stop reason: HOSPADM

## 2022-09-12 RX ADMIN — KETAMINE HYDROCHLORIDE 30 MG: 10 INJECTION INTRAMUSCULAR; INTRAVENOUS at 10:01

## 2022-09-12 RX ADMIN — SODIUM CHLORIDE, POTASSIUM CHLORIDE, SODIUM LACTATE AND CALCIUM CHLORIDE: 600; 310; 30; 20 INJECTION, SOLUTION INTRAVENOUS at 11:48

## 2022-09-12 RX ADMIN — LIDOCAINE HYDROCHLORIDE 100 MG: 20 INJECTION, SOLUTION INFILTRATION; PERINEURAL at 09:55

## 2022-09-12 RX ADMIN — FENTANYL CITRATE 50 MCG: 50 INJECTION INTRAMUSCULAR; INTRAVENOUS at 10:17

## 2022-09-12 RX ADMIN — GLYCOPYRROLATE 0.2 MG: 0.2 INJECTION INTRAMUSCULAR; INTRAVENOUS at 09:46

## 2022-09-12 RX ADMIN — GABAPENTIN 300 MG: 250 SOLUTION ORAL at 07:25

## 2022-09-12 RX ADMIN — ROCURONIUM BROMIDE 50 MG: 50 INJECTION INTRAVENOUS at 09:55

## 2022-09-12 RX ADMIN — HYDROMORPHONE HYDROCHLORIDE 2 MG: 2 TABLET ORAL at 18:58

## 2022-09-12 RX ADMIN — FENTANYL CITRATE 50 MCG: 50 INJECTION INTRAMUSCULAR; INTRAVENOUS at 09:54

## 2022-09-12 RX ADMIN — Medication 3 G: at 09:35

## 2022-09-12 RX ADMIN — SODIUM CHLORIDE, POTASSIUM CHLORIDE, SODIUM LACTATE AND CALCIUM CHLORIDE 150 ML/HR: 600; 310; 30; 20 INJECTION, SOLUTION INTRAVENOUS at 16:34

## 2022-09-12 RX ADMIN — METOCLOPRAMIDE HYDROCHLORIDE 10 MG: 5 INJECTION INTRAMUSCULAR; INTRAVENOUS at 14:38

## 2022-09-12 RX ADMIN — MIRTAZAPINE 15 MG: 15 TABLET, ORALLY DISINTEGRATING ORAL at 23:23

## 2022-09-12 RX ADMIN — METOCLOPRAMIDE HYDROCHLORIDE 10 MG: 5 INJECTION INTRAMUSCULAR; INTRAVENOUS at 08:31

## 2022-09-12 RX ADMIN — ACETAMINOPHEN 1000 MG: 10 INJECTION, SOLUTION INTRAVENOUS at 11:04

## 2022-09-12 RX ADMIN — ACETAMINOPHEN 1000 MG: 500 TABLET, FILM COATED ORAL at 16:34

## 2022-09-12 RX ADMIN — MAGNESIUM SULFATE HEPTAHYDRATE 2 G: 500 INJECTION, SOLUTION INTRAMUSCULAR; INTRAVENOUS at 10:02

## 2022-09-12 RX ADMIN — METOCLOPRAMIDE HYDROCHLORIDE 10 MG: 5 INJECTION INTRAMUSCULAR; INTRAVENOUS at 23:22

## 2022-09-12 RX ADMIN — LABETALOL HYDROCHLORIDE 5 MG: 5 INJECTION, SOLUTION INTRAVENOUS at 13:21

## 2022-09-12 RX ADMIN — DEXAMETHASONE SODIUM PHOSPHATE 10 MG: 4 INJECTION, SOLUTION INTRAMUSCULAR; INTRAVENOUS at 10:00

## 2022-09-12 RX ADMIN — FAMOTIDINE 20 MG: 10 INJECTION INTRAVENOUS at 23:30

## 2022-09-12 RX ADMIN — ROCURONIUM BROMIDE 10 MG: 50 INJECTION INTRAVENOUS at 10:31

## 2022-09-12 RX ADMIN — ONDANSETRON 4 MG: 2 INJECTION INTRAMUSCULAR; INTRAVENOUS at 11:00

## 2022-09-12 RX ADMIN — SODIUM CHLORIDE, POTASSIUM CHLORIDE, SODIUM LACTATE AND CALCIUM CHLORIDE 500 ML: 600; 310; 30; 20 INJECTION, SOLUTION INTRAVENOUS at 08:24

## 2022-09-12 RX ADMIN — FOLIC ACID 250 ML/HR: 5 INJECTION, SOLUTION INTRAMUSCULAR; INTRAVENOUS; SUBCUTANEOUS at 23:27

## 2022-09-12 RX ADMIN — SUGAMMADEX 200 MG: 100 INJECTION, SOLUTION INTRAVENOUS at 11:42

## 2022-09-12 RX ADMIN — ACETAMINOPHEN 1000 MG: 500 TABLET, FILM COATED ORAL at 23:22

## 2022-09-12 RX ADMIN — PROPOFOL 200 MCG/KG/MIN: 10 INJECTION, EMULSION INTRAVENOUS at 09:55

## 2022-09-12 RX ADMIN — CHLORHEXIDINE GLUCONATE 15 ML: 1.2 SOLUTION ORAL at 07:25

## 2022-09-12 RX ADMIN — LABETALOL HYDROCHLORIDE 5 MG: 5 INJECTION, SOLUTION INTRAVENOUS at 14:30

## 2022-09-12 RX ADMIN — FENTANYL CITRATE 50 MCG: 50 INJECTION INTRAMUSCULAR; INTRAVENOUS at 12:48

## 2022-09-12 RX ADMIN — SCOPALAMINE 1 PATCH: 1 PATCH, EXTENDED RELEASE TRANSDERMAL at 07:25

## 2022-09-12 RX ADMIN — GABAPENTIN 300 MG: 300 CAPSULE ORAL at 16:34

## 2022-09-12 RX ADMIN — PROPOFOL 200 MG: 10 INJECTION, EMULSION INTRAVENOUS at 09:55

## 2022-09-12 RX ADMIN — PANTOPRAZOLE SODIUM 40 MG: 40 INJECTION, POWDER, FOR SOLUTION INTRAVENOUS at 08:30

## 2022-09-12 NOTE — ANESTHESIA POSTPROCEDURE EVALUATION
"Patient: Sravanthi Kauffman    Procedure Summary     Date: 09/12/22 Room / Location:  [facility]  [facility]    Anesthesia Start: 0943 Anesthesia Stop: 1158    Procedure: GASTRIC BYPASS LAPAROSCOPIC conversion from sleeve,PARTIAL GASTRECTOMY (N/A Abdomen) Diagnosis:       Obesity, Class III, BMI 40-49.9 (morbid obesity) ([facility])      (Obesity, Class III, BMI 40-49.9 (morbid obesity) ([facility]) [E66.01])    Surgeons: Vinicio Arango Jr., MD Provider: Teodoro Mayer MD    Anesthesia Type: general ASA Status: 3          Anesthesia Type: general    Vitals  Vitals Value Taken Time   /93 09/12/22 1246   Temp     Pulse 89 09/12/22 1248   Resp 16 09/12/22 1215   SpO2 98 % 09/12/22 1248   Vitals shown include unvalidated device data.        Post Anesthesia Care and Evaluation    Patient location during evaluation: bedside  Patient participation: complete - patient participated  Level of consciousness: awake and alert  Pain score: 0  Pain management: adequate    Airway patency: patent  Anesthetic complications: No anesthetic complications    Cardiovascular status: acceptable  Respiratory status: acceptable  Hydration status: acceptable    Comments: /98   Pulse 90   Temp 36.7 °C (98 °F) (Oral)   Resp 16   Ht 160 cm (62.99\")   Wt 117 kg (258 lb 6.1 oz)   LMP 08/17/2022   SpO2 100%   BMI 45.78 kg/m²       "

## 2022-09-12 NOTE — ANESTHESIA PREPROCEDURE EVALUATION
Anesthesia Evaluation     Patient summary reviewed and Nursing notes reviewed   no history of anesthetic complications:  NPO Solid Status: > 8 hours  NPO Liquid Status: > 2 hours           Airway   Mallampati: II  TM distance: >3 FB  Neck ROM: full  no difficulty expected  Dental - normal exam     Pulmonary - normal exam   (+) shortness of breath,   (-) COPD, asthma, not a smoker, lung cancer  Cardiovascular - normal exam  Exercise tolerance: good (4-7 METS)    ECG reviewed  Rhythm: regular  Rate: normal    (+) valvular problems/murmurs murmur, dysrhythmias PAC, HALL,   (-) hypertension, past MI, CAD, angina, CHF, cardiac stents, CABG, pericardial effusion    ROS comment: TTE 05/2022:  ·Left ventricular wall thickness is consistent with mild to moderate septal asymmetric hypertrophy.  ·Calculated left ventricular EF = 64.1% Estimated left ventricular EF was in agreement with the calculated left ventricular EF. Left ventricular systolic function is normal.  ·Estimated right ventricular systolic pressure from tricuspid regurgitation is normal (<35 mmHg).  ·Left ventricular diastolic function was normal.  ·Saline test results are negative.        Neuro/Psych  (+) numbness, psychiatric history Anxiety,    (-) seizures, TIA, CVA  GI/Hepatic/Renal/Endo    (+) morbid obesity,    (-) hiatal hernia, GERD, PUD, hepatitis, liver disease, no renal disease, diabetes, GI bleed, no thyroid disorder    Musculoskeletal     (+) back pain,   Abdominal   (+) obese,     Abdomen: soft.   Substance History - negative use     OB/GYN          Other   arthritis,                      Anesthesia Plan    ASA 3     general     intravenous induction     Anesthetic plan, risks, benefits, and alternatives have been provided, discussed and informed consent has been obtained with: patient.    Plan discussed with CRNA.        CODE STATUS:

## 2022-09-12 NOTE — OP NOTE
PREOPERATIVE DIAGNOSIS:  Morbid obesity with multiple comorbidities as referenced in the most recent history and physical.  Patient status post laparoscopic Sleeve Gastrectomy and now presents for revision    POSTOPERATIVE DIAGNOSIS:  Morbid obesity with multiple comorbidities as referenced in the most recent history and physical.  Status post laparoscopic Sleeve Gastrectomy with adhesions.  Retained fundus    PROCEDURES PERFORMED:  1.  Laparoscopic antecolic antegastric Divya-en-Y divided gastric bypass conversion from previous Sleeve Gastrectomy  2.  Laparoscopic partial gastrectomy  2.  Extensive laparoscopic lysis of adhesions  3.  Tisseel application    Approximately 40 minutes was spent taking down the gastric plication as well as the extensive adhesions    SURGEON: Vinicio Arango Jr., MD    ASSISTANT: Shayne [facility]      Surgery assisted and facilitated by a certified physician assistant, who directly resulted in a decreased operative time, anesthetic time, wound exposure, and possibly of an operative wound infection, thereby decreasing patient morbidity and ultimately total expenditures.    ANESTHESIA: General endotracheal and TAP block with Ropivacaine mixture    ESTIMATED BLOOD LOSS:  Less than 25 mL unless dictated below.    SPECIMENS:  None.    DRAINS:  none    COUNTS:  Correct.    COMPLICATIONS:  None.      INDICATIONS:   This patient presents for elective laparoscopic Divya-en-Y divided gastric bypass. The patient has undergone our extensive preoperative evaluation, teaching and consent process.       DESCRIPTION OF PROCEDURE:  The patient was brought to the operating room and placed in the supine position on the operating room table. The patient had previously received subcutaneous Lovenox and IV antibiotics as well as SCD hose. The patient underwent uneventful general endotracheal anesthesia per the Anesthesiology staff. The abdomen was prepped with ChloraPrep and draped in the usual sterile  fashion. An Ioban was used as well if not allergic.     A 1 cm transverse incision was made to the left of midline in the upper abdomen and the peritoneal cavity entered under direct camera visualization using a 10 mm 0° laparoscope and an Optiview trocar. The abdomen was insufflated to a pressure of 15 mmHg with C02 gas. A 10 mm angled laparoscope was then used. Exploratory laparoscopy revealed no evidence of injury from the entrance technique and otherwise no abnormalities unless addendum dictated below. Under direct camera visualization, a 12 mm trocar was placed in the right lateral subcostal position and a 12 mm trocar was placed in the left lateral subcostal position. Two 5 mm trocars were placed, one left mid abdomen and one in the right mid abdomen.  At the trocar sites the skin and deeper tissues were infiltrated with local anesthetic.     Patient did have extensive adhesions in the upper abdomen from previous sleeve gastrectomy.  Prior to placing the liver retractor the adhesions from the stomach to the left lobe of the liver were taking down using electrocautery, scissors and harmonic device.     I then chose an area along the lesser curve of the stomach approximately 6 centimeters distally to the GE junction and began dissection with the harmonic scalpel.  I either entered the retrogastric space using careful dissection with the harmonic scalpel or using pars flaccida with a white load 60 mm.  I then placed a  60 mm green load with frank-strip in this area for a horizontal staple line to form the gastric pouch which was measured from the GE junction.    I divided the omentum with the vessel sealer starting with the area near the transverse colon and worked my way superiorly to make way for the Divya limb.     I then identified the ligament of Treitz and ran the bowel distally 75 cm and brought this loop up to the gastric pouch.  With the proximal biliary limb on the patient left side and the distal  alimentary limb on the right side, I performed a linear gastrojejunostomy by making a gastrotomy in the gastric pouch on the posterior side near the staple line in the midline of this region and then also made an enterotomy in the jejunum.  I used a 60 mm Rocky Ripple stapler with a blue load and made the staple line 30 mm.  I was then able to inspect the staple line and there was no bleeding.  I then closed the enterotomy in two layers with a running 2-0 absorbable Vicryl suture x 2.      I then divided the bowel using a white load with frank strip and fired it across the jejunum just to the left of the gastrojejunostomy and then at that point the limb on the patient's left side was the biliopancreatic limb.      After this I ran the Divya limb distally 150 cm and then performed a side-to-side anastomosis with a white load using the 60 mm Rocky Ripple stapler.  The common enterotomy was then closed with 2-0 Vicryl suture in a running fashion in 2 layers.    At this point I performed a leak test.  The anastomosis was submerged under saline and the area was insufflated with air.  No air bubbles were seen unless dictated below.  The bougie was also passed across the stoma without difficulty.  The irrigation fluid was suctioned out.    I then closed the mesenteric defect at the jejunojejunostomy with a running 2-0 nonabsorbable silk suture and I also closed Valdes space with a running 2-0 nonabsorbable silk suture.      Tisseel was sprayed over the gastrojejunostomy and also the mesentery between the distal biliary limb and the gastrojejunostomy.  The liver retractor was removed.    Then the abdomen was desufflated and all the trochars were removed under direct visualization.  No bleeding was noted.  All incisions were infiltrated with the local anesthetic.  All the skin incisions were closed with 4-0 Monocryl and surgical glue.    The patient did have retained fundus.  The short gastric vessels were not taken down during  original sleep.  Short gastrics were taken down using the harmonic scalpel.  The fundus and part of the greater curve trocar was divided with the [place] 60 stapler with gold load with EDDI strip.  The stomach was then removed through the 12 mm trocar site.    All sponges, needles, and instruments were counted and were correct.    The hiatus was checked for a hernia and no hernia was detected.

## 2022-09-12 NOTE — ANESTHESIA PROCEDURE NOTES
Airway  Urgency: elective    Date/Time: 9/12/2022 9:59 AM  Airway not difficult    General Information and Staff    Patient location during procedure: OR  Anesthesiologist: Teodoro Mayer MD  CRNA/CAA: Sara Rodriguez CRNA    Indications and Patient Condition  Indications for airway management: airway protection    Preoxygenated: yes  MILS not maintained throughout  Mask difficulty assessment: 1 - vent by mask    Final Airway Details  Final airway type: endotracheal airway      Successful airway: ETT  Cuffed: yes   Successful intubation technique: direct laryngoscopy  Facilitating devices/methods: intubating stylet  Endotracheal tube insertion site: oral  Blade: CMAC  Blade size: D  ETT size (mm): 7.0  Cormack-Lehane Classification: grade IIa - partial view of glottis  Placement verified by: chest auscultation and capnometry   Cuff volume (mL): 6  Measured from: teeth  ETT/EBT  to teeth (cm): 20  Number of attempts at approach: 2  Assessment: lips, teeth, and gum same as pre-op and atraumatic intubation    Additional Comments  Airway exam prior to DL, teeth/lips inspected. Preoxygenated with 100% O2; sniffing position, easy mask ventilation. Eyes taped. DL x 1 w/ Holman 2 blade- Grade III view- pt head positioning hindering view- head flexed forward, chin almost to pt chest d/t pt hair. Pillow pushed further under pt neck, pt mask ventilated. Second attempt w/ CMAC D blade- Grade IIa indirect view noted. Able to successfully pass ETT. Dentition intact, no damage. ETT connected to vent. Confirmed EBBS, +EtCO2. VS remained stable during entire induction sequence.

## 2022-09-12 NOTE — H&P
Bariatric Consult:  Referred by Cindy Loyola APRN     Sravanthi Kauffman is here today for consult on Consult        History of Present Illness:        Sravanthi Kauffman is a 42 y.o. female with morbid obesity with co-morbidities including back pain and knee pain who presents for surgical consultation for the above procedure. Sravanthi has completed the initial intake visit and has been examined by our nurse practitioner, dietician, psychologist and underwent the extensive educational teaching process under the guidance of our bariatric coordinator and myself. Sravanthi also has seen or if has not yet will see the educational video TOYA on the surgical procedure if available. Sravanthi attended today more educational teaching from our bariatric coordinator and myself. Sravanthi has had an extensive medical workup including a visit with their primary care physician, EKG, chest radiograph, blood work, EGD or UGI and possibly further testing. These have been reviewed by me and discussed with the patient. Sravanthi is now ready to proceed with surgery. Sravanthi presently denies nausea, vomiting, fever, chills, chest pain, shortness of air, melena, hematochezia, hemetemesis, dysuria, frequency, hematuria.         Medical History        Past Medical History:   Diagnosis Date   • Anemia     • Obesity     • Osteoarthritis of left knee                   Encounter Diagnoses   Name Primary?   • Obesity, Class III, BMI 40-49.9 (morbid obesity) ([facility]) Yes   • S/P laparoscopic sleeve gastrectomy     • Dietary counseling     • Chronic back pain, unspecified back location, unspecified back pain laterality     • Other iron deficiency anemia     • Constipation, unspecified constipation type           Surgical History         Past Surgical History:   Procedure Laterality Date   •  SECTION   1998   • ENDOSCOPY N/A 2022     Procedure: ESOPHAGOGASTRODUODENOSCOPY WITH BIOPSY;  Surgeon: Vinicio Arango Jr., MD;  Location:  [facility] ENDOSCOPY;  Service: General;  Laterality: N/A;  pre: dyspepsia, hx of gastric sleeve   post: gastritis    • GASTRIC SLEEVE LAPAROSCOPIC   04/14/2012                Patient Active Problem List   Diagnosis   • Constipation   • Shoulder pain   • Hip pain   • Back pain   • Carpal tunnel syndrome   • Anemia   • Obesity, Class III, BMI 40-49.9 (morbid obesity) ([facility])   • S/P laparoscopic sleeve gastrectomy   • Numbness and tingling   • APC (atrial premature contractions)   • Dietary counseling              Allergies   Allergen Reactions   • Latex Itching            Current Outpatient Medications:   •  FeroSul 325 (65 Fe) MG tablet, , Disp: , Rfl:   •  topiramate (TOPAMAX) 25 MG tablet, TAKE 1 TABLET BY MOUTH EVERY NIGHT, Disp: 90 tablet, Rfl: 0  •  Enoxaparin Sodium (LOVENOX) 40 MG/0.4ML solution prefilled syringe syringe, Inject 0.4 mL under the skin into the appropriate area as directed Every 12 (Twelve) Hours for 14 days. Start after surgery unless instructed otherwise, Disp: 11.2 mL, Rfl: 0  •  folic acid-vit B6-vit B12 (FOLBEE) 2.5-25-1 MG tablet tablet, Take 1 tablet by mouth Daily., Disp: 40 tablet, Rfl: 0  •  ursodiol (Actigall) 300 MG capsule, Take 1 capsule by mouth 2 (Two) Times a Day., Disp: 60 capsule, Rfl: 5     Social History   Social History            Socioeconomic History   • Marital status: Single   Tobacco Use   • Smoking status: Never Smoker   • Smokeless tobacco: Never Used   Vaping Use   • Vaping Use: Never used   Substance and Sexual Activity   • Alcohol use: Yes       Comment: rare/caffeine use   • Drug use: Never   • Sexual activity: Defer                  Family History   Problem Relation Age of Onset   • Chronic fatigue Mother     • Cancer Mother     • Obesity Mother     • Hypertension Mother     • Hyperlipidemia Father     • Obesity Father     • Hypertension Father     • No Known Problems Sister     • Obesity Brother     • Sleep apnea Brother     • Sleep apnea Brother     • Obesity  Brother     • No Known Problems Brother     • No Known Problems Son     • Obesity Maternal Grandmother     • Stroke Maternal Grandmother     • Cancer Maternal Grandfather     • Prostate cancer Maternal Grandfather     • No Known Problems Paternal Grandmother     • No Known Problems Paternal Grandfather           Review of Systems:  Review of Systems   Constitutional: Positive for fatigue.   Gastrointestinal: Positive for constipation.   Musculoskeletal: Positive for arthralgias and back pain.   All other systems reviewed and are negative.           Physical Exam:     Vital Signs:  Weight: 119 kg (262 lb)   Body mass index is 46.08 kg/m².  Temp: 97.4 °F (36.3 °C)   Heart Rate: 83   BP: 124/76         Physical Exam  Vitals reviewed.   HENT:      Head: Normocephalic and atraumatic.      Mouth/Throat:      Mouth: Mucous membranes are moist.      Pharynx: Oropharynx is clear.   Eyes:      General: No scleral icterus.     Extraocular Movements: Extraocular movements intact.      Conjunctiva/sclera: Conjunctivae normal.      Pupils: Pupils are equal, round, and reactive to light.   Neck:      Thyroid: No thyromegaly.   Cardiovascular:      Rate and Rhythm: Normal rate.   Pulmonary:      Effort: Pulmonary effort is normal. No respiratory distress.      Breath sounds: Normal breath sounds. No stridor. No wheezing or rhonchi.   Abdominal:      General: Bowel sounds are normal.      Palpations: Abdomen is soft.      Tenderness: There is no abdominal tenderness. There is no right CVA tenderness, left CVA tenderness, guarding or rebound.      Hernia: No hernia is present.   Musculoskeletal:         General: Normal range of motion.      Cervical back: Normal range of motion and neck supple.   Lymphadenopathy:      Cervical: No cervical adenopathy.   Skin:     General: Skin is warm and dry.      Findings: No erythema.   Neurological:      Mental Status: She is alert and oriented to person, place, and time.   Psychiatric:          Mood and Affect: Mood normal.         Behavior: Behavior normal.         Thought Content: Thought content normal.         Judgment: Judgment normal.               Assessment:     Sravanthi Kauffman is a 42 y.o. year old female with medically complicated severe obesity with a BMI of Body mass index is 46.08 kg/m². and multiple co-morbidities listed in the encounter diagnosis.     I think she is an appropriate candidate for this surgery, and is ready to proceed.        Plan/Discussion/Summary:  No hiatal hernia per me.  Patient status post laparoscopic sleeve gastrectomy 2012 in [place].  Does not know if hiatal hernia repaired.  No PPI.  H. pylori negative.  Patient with history of anemia without the actual etiology.  She understands increased risk of iron deficiency after gastric bypass because of malabsorption.  I instructed her that this may worsen her anemia.  I did instruct her that she has to be on bariatric vitamins for better absorption which he understands.  Patient understands that they are at an increased risk for complications because of this being a revisional surgery/conversion  to another procedure.  The patient understands the increased risk of gastric injury/leak, bleeding, etc because of the scarring and previous surgery.  Also increased risk of other complications that are listed because of increased OR time.     The patient has returned to the office for a surgical consultation and has requested to proceed with the Divya-en-Y  gastric bypass.  I have had the opportunity to obtain the history, examine the patient and review the patient's chart.  The procedure could be done laparoscopically and or robotically.     The patient understands that surgery is a tool and that weight loss is not guaranteed but only seen in the context of appropriate use, regular follow up, exercise and making appropriate food choices.       I have personally discussed the potential complications of the laparoscopic  gastric bypass with this patient.  The patient is well aware of the potential complications of the surgery that include but not limited to bleeding, infections, deep venous thrombosis, pulmonary embolism, pulmonary complications such as pneumonia, cardiac events, hernias, small bowel obstruction, damage to the spleen or other organs, bowel injury, disfiguring scars, failure to lose weight, need for additional surgery, conversion to an open procedure and death.  I also discussed the risks that apply in particular to the gastric bypass such as the leaking of stomach and/or intestinal contents at the staple or suture line, the development of an intra-abdominal abscess,  strictures, ulcers, and vitamin/mineral deficiencies.  The patient was strongly advised to avoid smoking and the use of non-steroidal anti-inflammatory drugs such as ibuprofen, Motrin and Advil in the postoperative period and understands the increased risk of ulcer formation, perforation, death if they did not stop the use of these medications/substances.      The risks, benefits, potential complications and alternative therapies were discussed at great lengths as outlined in our extensive consent forms, online consent, and educational teaching processes.       The patient has confirmed participation in the program's extensive educational activities.       All questions and concerns were answered to the patient's satisfaction.  The patient now wishes to proceed with surgery.     The patient agreed to a postoperative course of anticoagulant therapy.     I instructed patient that the surgery could be laparoscopically and/or robotically.     I instructed patient to start on a H2 blocker or proton pump inhibitor if not already on one of these medications.     I explained in detail the procedures that are in the consent.  All of these procedures have a chance to convert to open if any technical challenges or complications do occur.  Bariatric surgery is not  cosmetic surgery but rather a tool to help a patient make a life-long commitment lifestyle change including diet, exercise, behavior changes, and taking supplemental vitamins and minerals.     Problems after surgery may require more operations to correct them.     The risks, benefits, alternatives, and potential complications of all of the procedures were explained in detail including, but not limited to death, anesthesia and medication adverse effect, deep venous thrombosis, pulmonary embolism, trocar site/incisional hernia, wound infection, abdominal infection, bleeding, failure to lose weight, gain weight, a change in body image, metabolic complications with vitamin deficiences and anemia.     Weight loss expectations were discussed with the patient in detail. The weight loss operations most commonly performed are the sleeve gastrectomy and the Divya-en-Y gastric bypass. These operations result in weight losses up to approximately 25-35% of initial body weight 12 to 24 months after surgery with the gastric bypass usually the higher percent of weight loss but depends on patient using the tool.     For the gastric bypass and loop duodenal switch (SEEMA-S) the risks include but not limited to the following early complications:  Anastomotic leak/peritonitis, Divya/Alimentary/biliopancreatic limb obstruction, severe & minor wound infection/seroma, and nausea/vomiting.  Late complications can include but are not limited to malnutrition, vitamin deficiencies, frequent loose stools,  stomal stenosis, marginal ulcer, bowel obstruction, intussusception, internal, and incisional hernia.     Regarding the gastric sleeve, there is higher risk of dysphagia and reflux leading to possible Ware's esophagus compared to a gastric bypass, as well as risk of internal visceral/organ injury, splenectomy, bleeding, infection, leak (which could require further intervention possible conversion to Divya-en-Y gastric bypass), stenosis and  possibility of regaining weight.     Sravanthi was counseled regarding diagnostic results, instructions for management, risk factor reductions, prognosis, patient and family education, impressions, risks and benefits of treatment options and importance of compliance with treatment. Total time of the encounter was over 45 minutes counseling the patient regarding the procedure as above and reviewing as well as ordering labs, medications and the procedure.  The chart was also reviewed prior to seeing the patient reviewing previous testing, studies and labs.     Sravanthi understands the surgical procedures and the different surgical options that are available.  She understands the lifestyle changes that are required after surgery and has agreed to follow the guidelines outlined in the weight management program.  She also expressed understanding of the risks involved and had all of female questions answered and desires to proceed.

## 2022-09-12 NOTE — PLAN OF CARE
Problem: Adult Inpatient Plan of Care  Goal: Plan of Care Review  Outcome: Ongoing, Progressing  Goal: Patient-Specific Goal (Individualized)  Outcome: Ongoing, Progressing  Goal: Absence of Hospital-Acquired Illness or Injury  Outcome: Ongoing, Progressing  Intervention: Identify and Manage Fall Risk  Recent Flowsheet Documentation  Taken 9/12/2022 1643 by Mary Jane Pearce, RN  Safety Promotion/Fall Prevention:   activity supervised   fall prevention program maintained   nonskid shoes/slippers when out of bed   safety round/check completed  Goal: Optimal Comfort and Wellbeing  Outcome: Ongoing, Progressing  Intervention: Provide Person-Centered Care  Recent Flowsheet Documentation  Taken 9/12/2022 1643 by Mary Jane Pearce, RN  Trust Relationship/Rapport:   care explained   choices provided   questions answered   questions encouraged   thoughts/feelings acknowledged  Goal: Readiness for Transition of Care  Outcome: Ongoing, Progressing  Intervention: Mutually Develop Transition Plan  Recent Flowsheet Documentation  Taken 9/12/2022 1600 by Mary Jane Pearce, RN  Equipment Currently Used at Home: none  Transportation Anticipated: family or friend will provide  Transportation Concerns: none  Patient/Family Anticipated Services at Transition: none  Patient/Family Anticipates Transition to: home with family   Goal Outcome Evaluation:

## 2022-09-13 ENCOUNTER — READMISSION MANAGEMENT (OUTPATIENT)
Dept: CALL CENTER | Facility: HOSPITAL | Age: 42
End: 2022-09-13

## 2022-09-13 VITALS
DIASTOLIC BLOOD PRESSURE: 87 MMHG | TEMPERATURE: 99.3 F | HEIGHT: 63 IN | OXYGEN SATURATION: 97 % | WEIGHT: 258.38 LBS | HEART RATE: 58 BPM | RESPIRATION RATE: 16 BRPM | BODY MASS INDEX: 45.78 KG/M2 | SYSTOLIC BLOOD PRESSURE: 156 MMHG

## 2022-09-13 LAB
ALBUMIN SERPL-MCNC: 3.6 G/DL (ref 3.5–5.2)
ALBUMIN/GLOB SERPL: 1.1 G/DL
ALP SERPL-CCNC: 63 U/L (ref 39–117)
ALT SERPL W P-5'-P-CCNC: 73 U/L (ref 1–33)
ANION GAP SERPL CALCULATED.3IONS-SCNC: 12 MMOL/L (ref 5–15)
AST SERPL-CCNC: 39 U/L (ref 1–32)
BASOPHILS # BLD AUTO: 0.01 10*3/MM3 (ref 0–0.2)
BASOPHILS NFR BLD AUTO: 0.1 % (ref 0–1.5)
BILIRUB SERPL-MCNC: 0.4 MG/DL (ref 0–1.2)
BUN SERPL-MCNC: 8 MG/DL (ref 6–20)
BUN/CREAT SERPL: 12.9 (ref 7–25)
CALCIUM SPEC-SCNC: 9.2 MG/DL (ref 8.6–10.5)
CHLORIDE SERPL-SCNC: 105 MMOL/L (ref 98–107)
CO2 SERPL-SCNC: 20 MMOL/L (ref 22–29)
CREAT SERPL-MCNC: 0.62 MG/DL (ref 0.57–1)
DEPRECATED RDW RBC AUTO: 50.6 FL (ref 37–54)
EGFRCR SERPLBLD CKD-EPI 2021: 113.5 ML/MIN/1.73
EOSINOPHIL # BLD AUTO: 0 10*3/MM3 (ref 0–0.4)
EOSINOPHIL NFR BLD AUTO: 0 % (ref 0.3–6.2)
ERYTHROCYTE [DISTWIDTH] IN BLOOD BY AUTOMATED COUNT: 14.6 % (ref 12.3–15.4)
GLOBULIN UR ELPH-MCNC: 3.3 GM/DL
GLUCOSE SERPL-MCNC: 101 MG/DL (ref 65–99)
HCT VFR BLD AUTO: 36 % (ref 34–46.6)
HGB BLD-MCNC: 11.6 G/DL (ref 12–15.9)
IMM GRANULOCYTES # BLD AUTO: 0.01 10*3/MM3 (ref 0–0.05)
IMM GRANULOCYTES NFR BLD AUTO: 0.1 % (ref 0–0.5)
LAB AP CASE REPORT: NORMAL
LYMPHOCYTES # BLD AUTO: 0.86 10*3/MM3 (ref 0.7–3.1)
LYMPHOCYTES NFR BLD AUTO: 11.1 % (ref 19.6–45.3)
MAGNESIUM SERPL-MCNC: 2 MG/DL (ref 1.6–2.6)
MCH RBC QN AUTO: 30.1 PG (ref 26.6–33)
MCHC RBC AUTO-ENTMCNC: 32.2 G/DL (ref 31.5–35.7)
MCV RBC AUTO: 93.3 FL (ref 79–97)
MONOCYTES # BLD AUTO: 0.89 10*3/MM3 (ref 0.1–0.9)
MONOCYTES NFR BLD AUTO: 11.5 % (ref 5–12)
NEUTROPHILS NFR BLD AUTO: 5.98 10*3/MM3 (ref 1.7–7)
NEUTROPHILS NFR BLD AUTO: 77.2 % (ref 42.7–76)
NRBC BLD AUTO-RTO: 0 /100 WBC (ref 0–0.2)
PATH REPORT.FINAL DX SPEC: NORMAL
PATH REPORT.GROSS SPEC: NORMAL
PHOSPHATE SERPL-MCNC: 3.7 MG/DL (ref 2.5–4.5)
PLATELET # BLD AUTO: 155 10*3/MM3 (ref 140–450)
PMV BLD AUTO: 11.6 FL (ref 6–12)
POTASSIUM SERPL-SCNC: 4 MMOL/L (ref 3.5–5.2)
PROT SERPL-MCNC: 6.9 G/DL (ref 6–8.5)
RBC # BLD AUTO: 3.86 10*6/MM3 (ref 3.77–5.28)
SODIUM SERPL-SCNC: 137 MMOL/L (ref 136–145)
WBC NRBC COR # BLD: 7.75 10*3/MM3 (ref 3.4–10.8)

## 2022-09-13 PROCEDURE — 25010000002 METOCLOPRAMIDE PER 10 MG: Performed by: SURGERY

## 2022-09-13 PROCEDURE — 25010000002 CYANOCOBALAMIN PER 1000 MCG: Performed by: SURGERY

## 2022-09-13 PROCEDURE — 80053 COMPREHEN METABOLIC PANEL: CPT | Performed by: SURGERY

## 2022-09-13 PROCEDURE — 85025 COMPLETE CBC W/AUTO DIFF WBC: CPT | Performed by: SURGERY

## 2022-09-13 PROCEDURE — 84100 ASSAY OF PHOSPHORUS: CPT | Performed by: SURGERY

## 2022-09-13 PROCEDURE — 83735 ASSAY OF MAGNESIUM: CPT | Performed by: SURGERY

## 2022-09-13 PROCEDURE — 25010000002 ENOXAPARIN PER 10 MG: Performed by: SURGERY

## 2022-09-13 PROCEDURE — 25010000002 THIAMINE PER 100 MG: Performed by: SURGERY

## 2022-09-13 RX ORDER — ENOXAPARIN SODIUM 100 MG/ML
40 INJECTION SUBCUTANEOUS EVERY 12 HOURS SCHEDULED
Qty: 5.6 ML | Refills: 0
Start: 2022-09-13 | End: 2022-09-20

## 2022-09-13 RX ORDER — PANTOPRAZOLE SODIUM 40 MG/1
40 TABLET, DELAYED RELEASE ORAL DAILY
Qty: 30 TABLET | Refills: 5 | Status: SHIPPED | OUTPATIENT
Start: 2022-09-13 | End: 2023-01-17 | Stop reason: SDUPTHER

## 2022-09-13 RX ORDER — ONDANSETRON 4 MG/1
4 TABLET, ORALLY DISINTEGRATING ORAL EVERY 4 HOURS PRN
Qty: 20 TABLET | Refills: 0 | Status: SHIPPED | OUTPATIENT
Start: 2022-09-13 | End: 2023-01-17

## 2022-09-13 RX ORDER — SUCRALFATE 1 G/1
1 TABLET ORAL 4 TIMES DAILY
Qty: 120 TABLET | Refills: 0 | Status: SHIPPED | OUTPATIENT
Start: 2022-09-13 | End: 2022-10-13

## 2022-09-13 RX ORDER — HYDROMORPHONE HYDROCHLORIDE 2 MG/1
2 TABLET ORAL EVERY 4 HOURS PRN
Qty: 18 TABLET | Refills: 0 | Status: SHIPPED | OUTPATIENT
Start: 2022-09-13 | End: 2022-09-22

## 2022-09-13 RX ADMIN — ACETAMINOPHEN 1000 MG: 500 TABLET, FILM COATED ORAL at 12:14

## 2022-09-13 RX ADMIN — ENOXAPARIN SODIUM 40 MG: 100 INJECTION SUBCUTANEOUS at 12:14

## 2022-09-13 RX ADMIN — FAMOTIDINE 20 MG: 10 INJECTION INTRAVENOUS at 12:13

## 2022-09-13 RX ADMIN — METOCLOPRAMIDE HYDROCHLORIDE 10 MG: 5 INJECTION INTRAMUSCULAR; INTRAVENOUS at 12:14

## 2022-09-13 RX ADMIN — METOCLOPRAMIDE HYDROCHLORIDE 10 MG: 5 INJECTION INTRAMUSCULAR; INTRAVENOUS at 06:31

## 2022-09-13 RX ADMIN — GABAPENTIN 300 MG: 300 CAPSULE ORAL at 12:14

## 2022-09-13 RX ADMIN — GABAPENTIN 300 MG: 300 CAPSULE ORAL at 02:07

## 2022-09-13 RX ADMIN — CYANOCOBALAMIN 1000 MCG: 1000 INJECTION, SOLUTION INTRAMUSCULAR; SUBCUTANEOUS at 12:14

## 2022-09-13 NOTE — DISCHARGE INSTR - ACTIVITY
GOING HOME AFTER GASTRIC SLEEVE/ GASTRIC BYPASS SURGERY  [facility]: Post-Operative Information/Instructions  Vinicio Arango Jr., MD  General Patient Instructions for Discharge   - Call Surgeon's office at [phone] for follow-up appointment.    - Be sure you, the patient, have a follow-up appointment to be seen within seven (7) days after discharge. If not, please call [phone] to schedule an appointment. If you are discharged on a Saturday or Sunday, please call Monday to schedule the appointment.  - Contact the Surgeon at [phone] for any questions or concerns, including temperature greater than or equal to 101F, shortness of breath, leg swelling, redness at incision sites, nausea, vomiting, chills, or problems or questions.    - Follow the Gastric Stage 1 Diet    Clear liquids, room temperature, sugar-free, caffeine-free, non-carbonated, 70 grams of protein, No Straws.  - You may shower. No tub bath for 2 weeks.  - No lifting, pushing, pulling, or tugging >25 pounds for 3 weeks.  - Ambulate every 3 hours while awake minimum for seven (7) days, increase distance daily.  - For the next several weeks, you are at an increased risk for blood clot formation. Therefore, you should walk regularly. You should not sit for prolonged periods of time, more than 45 minutes, without getting up and walking for 5-10 minutes. This includes any car rides, including the drive home from the hospital. If driving any distance greater than 30 miles over the next two (2) weeks, stop every 30-45 minutes and walk for 5-10 minutes each time.  - Continue using Incentive Spirometer and coughing exercises at least every two (2) hours while awake for one week.  - Continue use of CPAP/BIPAP for diagnosis of sleep apnea as directed.  - No driving or operating machinery allowed while taking narcotic (prescription) pain medication, and until you feel comfortable forcefully applying the brakes if needed. (This  usually takes more than 3 days.)    - Make an appointment with your Primary Care Physician within one week post-op to look at your home medications for possible changes or discontinuity.   Medications  - The nurse will provide a list of medications for you to continue at home   - If you received a Lovenox (Enoxaparin) or Apixiban (Eliquis) prescription at pre-op visit with Surgeon, start taking the medicine the morning after discharge unless directed otherwise.    - If you were prescribed Lovenox (Enoxaparin), review the education/teaching material/video with the nurse.    - Take post op pain meds as prescribed as needed.   - Continue Foltx until finished.   - Resume use of Actigall (Ursodiol) one (1) week after surgery if patient still has gallbladder. You should have been given a prescription at your pre-op visit. Contact the office if you do not have the prescription.   - Resume bariatric vitamin regimen as instructed in pre-op education with bariatric coordinator.    - Zegerid or Prilosec OTC (or generic) by mouth once daily for four (4) weeks unless you are already taking a proton pump inhibitor as home medication. Follow dosing instructions on package.   Nausea/Vomiting:  The following are possible causes for nausea/vomiting:  - Drinking too much or too fast.  - Sinus drainage/post nasal drip for allergy sufferers (you may take Sudafed, Claritin, Tylenol Sinus/Allergy, or other decongestants and nose sprays to help with this discomfort).  - Low blood sugar (sweating, shaky, irritable, weakness, dizzy or tunnel-vision) - treatment is to sip 100% fruit juice - no sugar added until symptoms subside.  - Acid in fruit juice - (may dilute with water or avoid).  - Eating or drinking something that is not on clear liquid (stage 1) diet.  Any nausea/vomiting that prohibits you from keeping fluids down for greater than 24 hours requires a call to the surgeon's office.  Urine:  Use your urine color as a guide to  determine if you are drinking enough fluid. The darker the urine, the more fluids you need to drink. Urine should be clear to light yellow if you are getting enough fluid. If you should experience frequency, burning or pain with urination, blood in urine, contact us or your primary care physician for possible UTI (urinary tract infection), which could require antibiotics (liquid preferred).  Bowel Movements:  You may not have a bowel movement for 2-5 days after going home. You may then experience liquid, runny or loose stools for approximately 3-4 weeks following surgery. This would require you to drink even more fluids to prevent dehydration. Some patients may experience constipation, which can be treated with increased fluids, drinking warm liquids, increased activity and the use of a Fleets Enema, Milk of Magnesia, or suppositories. The first couple of bowel movements could be bloody, tarry black or dark maroon in color. This is OK as long as the stool returns to a normal color in 1-2 days. If however, you have frequent or a large amount of bloody or tarry black stools and/or become light-headed or dizzy, you may be bleeding and require urgent attention. Please call us right away.  Abdominal Incisions:  You will have small incisions. Do not scrub incisions, but allow the warm, soapy water to run over the incisions, rinse well, and pat dry. You may use any brand of anti-bacterial soap. Do not use Peroxide or Neosporin type ointments on sites, unless instructed to do so by a surgeon or nurse. Monitor daily for signs/symptoms of infection, which might include: drainage with a foul odor, pain, redness, swelling or heat at the incision sight; fever, body aches and chills. If you suspect infection or have a fever, give us a call.  Pain:  You will be given a prescription for pain medication to control your pain. If you feel the dose is too strong, you may take half the ordered dose, or you may take Tylenol adult liquid  per package instructions for minor pain. Do not take any medications that contain aspirin or aspirin products.  Do not take medications like: Motrin, Aleve, Ibuprofen, Advil, Naproxen, Celebrex, Daypro, Bextra, Meloxicam or other medications commonly used for arthritis or joint pain.  No steroids or cortisone injections. There may be pain, which should improve every few days. Pain should not suddenly get worse or more intense. Pain that suddenly changes and is constant and severe should be called in to the surgeon's office. Any sudden pain in the lower extremities with associated warmth and redness should be called in to the surgeon's office immediately. Do not rub or massage this area, as it could be a blood clot.  Diet:  Remain on the clear liquid diet (stage 1) per your  which includes 70 grams of protein each day, sugar free, non carbonated and no straws. Day 1 is the day of surgery. If you are tolerating the stage 1 diet, you may then proceed to stage 2 diet, as instructed in the . Do not progress to the stage 2 diet if you are having nausea/vomiting. Refer to the Basic Nutrition and Food Principles guide.  Medications:  The nurse will let you know which medications you will need to continue once you go home. Do not take any medications that are extended or time released if you had the gastric bypass procedure, OK to take if you had the gastric sleeve procedure. Large capsules can be opened and diluted with clear liquids. Check with your physician or pharmacist as to which pills may be crushed and which capsules may be opened and diluted safely. Continue taking Foltx as surgeon orders. If you still have your gall bladder and were prescribed Actigall (Ursodiol), you may resume this medication one week after your surgery. You will remain on Actigall (Ursodiol) for approximately 6 months. The dose is 1 pill, 2 times each day for 6 months.  Activity:  Continue your deep breathing and  coughing exercises with your Incentive Spirometer breathing device at least every 3 hours while awake (10 repetitions each time) for one week. May use CPAP. This will help to prevent respiratory problems such as pneumonia. No lifting, pulling or tugging anything over 25 pounds for 3 weeks after surgery. You may shower but no tub baths, hot tubs or swimming for 2 weeks. Moderate walking is recommended every 3 hours while awake minimum, increase distance daily. Further exercise will be discussed at the first post-op visit. No driving or operating machinery allow until off narcotic pain medication and until you feel comfortable forcefully applying the brakes (usually takes 3 or more days). For the next few weeks you are at an increased risk for blood clot formation. Therefore you should walk regularly and you should not sit for prolonged periods of time, more than 45 minutes without getting up and walking for 5-10 minutes. This includes car rides. Including riding home from the hospital. If riding a distance greater than 30 miles over the next 2 weeks stop every 30-45 minutes and walk 5-10 mintues each time. No tanning bed use for 8 weeks after surgery and in general, not recommended due to the increased risk for skin cancer. Incisions will burn/blister very badly with tanning bed use.  Illness:  Your primary care physician should treat general illness such as ear infections, sinus infections, and viral type illnesses, etc. Medications prescribed should be liquid/elixir form when possible, for the first 30 days.  General:  In general, it is recommended that you weigh yourself no more than once per week. Let the weight come off you and concentrate on more important things. Remember the weight was not gained overnight, nor will it be lost overnight. Gastric Bypass/ Gastric Sleeve weight loss will continue over a period of 12-18 months. Do not  yourself according to how others are doing after surgery, as this will  cause unnecessary discouragement.  THE ABOVE ARE GENERAL GUIDELINES TO ASSIST YOU ONCE HOME, IF YOU ARE IN DOUBT, OR YOU HAVE ANY QUESTIONS, CALL US AT THE NUMBERS LISTED BELOW.  IN THE EVENT OF SUDDEN CHEST PAIN, SHORTNESS OF BREATH, OR ANY LIFE THREATENING CONDITION, CALL 911.  Any time you are evaluated or admitted to another facility, please have someone notify the surgeon's office.  Supplements:  70 grams of protein taken EVERY DAY. Remember to drink at least 64 ounces of fluid a day, sipping slowly early on. Increase this amount during the summertime. Sipping slowly will not stretch your new stomach. Drinking too fast or gulping liquids will cause brief discomfort and early could cause staple line disruption (leak). With eating, tiny bites, then chew, chew, chew, and swallow. Lay your fork/spoon down for 2-3 minutes, and then take your next bite. Your pouch will tell you within 1-2 bites if it is going to tolerate what you are eating.   Protein Vendors:  Refer to protein vendors' handout from consult class. You can always find protein drinks at the bariatric office, grocery stores, [employer], drug [employer], [employer], health food stores, and on the Internet. Find one high in protein (15-30 grams per serving) and low carb (less than 18 grams per serving).  Now is a great time to re-read your . Please review specific instructions given to you at discharge by your physician (surgeon).  HOW/WHEN TO CONTACT US:  It is imperative that you contact us with any of the following:    - fever greater than 101 degrees  - shortness of breath  - leg swelling  - body aches  - shaking chills  - nausea and vomitting  - pain that has worsened  - redness at incision sites  - pus or foul smelling drainage from an incision or wound  - inability to keep fluids down for more than a day  - any other condition you feel needs our attention.  [facility] - Bariatric: [phone] call this number anytime 24 hours a  day / 7 days a week.  Teach-back Questions to be answered by the patient prior to discharge.   What complications would prompt you to call your doctor when you return home? _________________    What is the purpose of your prescribed medication? ________________  What are some potential side effects of the medications you will be taking at home? _______________

## 2022-09-13 NOTE — PLAN OF CARE
Goal Outcome Evaluation:Pt POD1 bariatric surgery, VSS,voiding well, emesisx2 today, appeared to be old blood, second time was more clear, pt educated on lovenox injections,pt up ambulating around in room with encouragement.DC instructions reviewed with pt, all questions answered. Pt taken with all belongings to DC exit.

## 2022-09-13 NOTE — PLAN OF CARE
Goal Outcome Evaluation: pt ready for discharge home, education on home self care completed, pain management and infection control management completed, safety and medication safety completed, all questions and concerns addressed, denies any concerns at the moment, all safety precautions in place.

## 2022-09-13 NOTE — PROGRESS NOTES
Continued Stay Note  [facility]     Patient Name: Sravanthi Kauffman  MRN: [id number]  Today's Date: 9/13/2022    Admit Date: 9/12/2022     Discharge Plan     Row Name 09/13/22 1057       Plan    Final Discharge Disposition Code 01 - home or self-care    Final Note D/c home               Discharge Codes    No documentation.               Expected Discharge Date and Time     Expected Discharge Date Expected Discharge Time    Sep 13, 2022             Brenda Dia RN

## 2022-09-13 NOTE — OUTREACH NOTE
Prep Survey    Flowsheet Row Responses   [facility] facility patient discharged from? [facility]   Is LACE score < 7 ? Yes   Emergency Room discharge w/ pulse ox? No   Eligibility [facility]   Date of Admission 09/12/22   Date of Discharge 09/13/22   Discharge Disposition Home or Self Care   Discharge diagnosis Gastric sleeve lap   Does the patient have one of the following disease processes/diagnoses(primary or secondary)? General Surgery   Does the patient have Home health ordered? No   Is there a DME ordered? No   Prep survey completed? Yes          AIDA DURAN - Registered Nurse

## 2022-09-14 ENCOUNTER — TRANSITIONAL CARE MANAGEMENT TELEPHONE ENCOUNTER (OUTPATIENT)
Dept: CALL CENTER | Facility: HOSPITAL | Age: 42
End: 2022-09-14

## 2022-09-14 NOTE — PAYOR COMM NOTE
"Brenda Thacker (42 y.o. Female)     ATTN: OP NOTE TO REVIEW:  [id number]    PATIENT DISCHARGED 09/13/2022      [facility] DEPT: [phone],  [phone]              Date of Birth   1980    Social Security Number       Address   [address] [place] [id number]    Home Phone   [phone]    MRN   [id number]       Baptism   None    Marital Status   Single                            Admission Date   9/12/22    Admission Type   Elective    Admitting Provider   Vinicio Arango Jr., MD    Attending Provider       Department, Room/Bed   [address], P794/1       Discharge Date   9/13/2022    Discharge Disposition   Home or Self Care    Discharge Destination                               Attending Provider: (none)   Allergies: Latex    Isolation: None   Infection: None   Code Status: Prior   Advance Care Planning Activity    Ht: 160 cm (62.99\")   Wt: 117 kg (258 lb 6.1 oz)    Admission Cmt: None   Principal Problem: Obesity, Class III, BMI 40-49.9 (morbid obesity) (HCC) [E66.01] More...                 Active Insurance as of 9/12/2022     Primary Coverage     Payor Plan Insurance Group Employer/Plan Group    ANTHEM MEDICAID AN[place] MEDICAID [id number]     Payor Plan Address Payor Plan Phone Number Payor Plan Fax Number Effective Dates    [address] [address] [phone]  3/1/2022 - None Entered    [facility] [id number]       Subscriber Name Subscriber Birth Date Member ID       BRENDA THACKER 1980 [id number]                 Emergency Contacts      (Rel.) Home Phone Work Phone Mobile Phone    JUAREZ IYER (Friend) -- -- [phone]               History & Physical      Vinicio Arango Jr., MD at 09/12/22 0655          Bariatric Consult:  Referred by  Cindy Loyola, APRN     Brenda Thacker is here today for consult on Consult        History of Present Illness:        Brenda Thacker is a 42 y.o.  female with morbid obesity with " co-morbidities including back pain and knee pain who presents for surgical consultation for the above procedure. Sravanthi has completed the initial intake visit and has been examined by our nurse practitioner, dietician, psychologist and underwent the extensive educational teaching process under the guidance of our bariatric coordinator and myself. Sravanthi also has seen or if has not yet will see the educational video TOYA on the surgical procedure if available. Sravanthi attended today more educational teaching from our bariatric coordinator and myself. Sravanthi has had an extensive medical workup including a visit with their primary care physician, EKG, chest radiograph, blood work, EGD or UGI and possibly further testing. These have been reviewed by me and discussed with the patient. Sravanthi is now ready to proceed with surgery. Sravanthi presently denies nausea, vomiting, fever, chills, chest pain, shortness of air, melena, hematochezia, hemetemesis, dysuria, frequency, hematuria.         Medical History        Past Medical History:   Diagnosis Date   • Anemia     • Obesity     • Osteoarthritis of left knee                   Encounter Diagnoses   Name Primary?   • Obesity, Class III, BMI 40-49.9 (morbid obesity) ([facility]) Yes   • S/P laparoscopic sleeve gastrectomy     • Dietary counseling     • Chronic back pain, unspecified back location, unspecified back pain laterality     • Other iron deficiency anemia     • Constipation, unspecified constipation type           Surgical History         Past Surgical History:   Procedure Laterality Date   •  SECTION   1998   • ENDOSCOPY N/A 2022     Procedure: ESOPHAGOGASTRODUODENOSCOPY WITH BIOPSY;  Surgeon: Vinicio Arango Jr., MD;  Location: [facility] ENDOSCOPY;  Service: General;  Laterality: N/A;  pre: dyspepsia, hx of gastric sleeve   post: gastritis    • GASTRIC SLEEVE LAPAROSCOPIC   2012                Patient Active Problem List   Diagnosis   •  Constipation   • Shoulder pain   • Hip pain   • Back pain   • Carpal tunnel syndrome   • Anemia   • Obesity, Class III, BMI 40-49.9 (morbid obesity) (HCC)   • S/P laparoscopic sleeve gastrectomy   • Numbness and tingling   • APC (atrial premature contractions)   • Dietary counseling              Allergies   Allergen Reactions   • Latex Itching            Current Outpatient Medications:   •  FeroSul 325 (65 Fe) MG tablet, , Disp: , Rfl:   •  topiramate (TOPAMAX) 25 MG tablet, TAKE 1 TABLET BY MOUTH EVERY NIGHT, Disp: 90 tablet, Rfl: 0  •  Enoxaparin Sodium (LOVENOX) 40 MG/0.4ML solution prefilled syringe syringe, Inject 0.4 mL under the skin into the appropriate area as directed Every 12 (Twelve) Hours for 14 days. Start after surgery unless instructed otherwise, Disp: 11.2 mL, Rfl: 0  •  folic acid-vit B6-vit B12 (FOLBEE) 2.5-25-1 MG tablet tablet, Take 1 tablet by mouth Daily., Disp: 40 tablet, Rfl: 0  •  ursodiol (Actigall) 300 MG capsule, Take 1 capsule by mouth 2 (Two) Times a Day., Disp: 60 capsule, Rfl: 5     Social History   Social History            Socioeconomic History   • Marital status: Single   Tobacco Use   • Smoking status: Never Smoker   • Smokeless tobacco: Never Used   Vaping Use   • Vaping Use: Never used   Substance and Sexual Activity   • Alcohol use: Yes       Comment: rare/caffeine use   • Drug use: Never   • Sexual activity: Defer                  Family History   Problem Relation Age of Onset   • Chronic fatigue Mother     • Cancer Mother     • Obesity Mother     • Hypertension Mother     • Hyperlipidemia Father     • Obesity Father     • Hypertension Father     • No Known Problems Sister     • Obesity Brother     • Sleep apnea Brother     • Sleep apnea Brother     • Obesity Brother     • No Known Problems Brother     • No Known Problems Son     • Obesity Maternal Grandmother     • Stroke Maternal Grandmother     • Cancer Maternal Grandfather     • Prostate cancer Maternal Grandfather     •  No Known Problems Paternal Grandmother     • No Known Problems Paternal Grandfather           Review of Systems:  Review of Systems   Constitutional: Positive for fatigue.   Gastrointestinal: Positive for constipation.   Musculoskeletal: Positive for arthralgias and back pain.   All other systems reviewed and are negative.           Physical Exam:     Vital Signs:  Weight: 119 kg (262 lb)   Body mass index is 46.08 kg/m².  Temp: 97.4 °F (36.3 °C)   Heart Rate: 83   BP: 124/76         Physical Exam  Vitals reviewed.   HENT:      Head: Normocephalic and atraumatic.      Mouth/Throat:      Mouth: Mucous membranes are moist.      Pharynx: Oropharynx is clear.   Eyes:      General: No scleral icterus.     Extraocular Movements: Extraocular movements intact.      Conjunctiva/sclera: Conjunctivae normal.      Pupils: Pupils are equal, round, and reactive to light.   Neck:      Thyroid: No thyromegaly.   Cardiovascular:      Rate and Rhythm: Normal rate.   Pulmonary:      Effort: Pulmonary effort is normal. No respiratory distress.      Breath sounds: Normal breath sounds. No stridor. No wheezing or rhonchi.   Abdominal:      General: Bowel sounds are normal.      Palpations: Abdomen is soft.      Tenderness: There is no abdominal tenderness. There is no right CVA tenderness, left CVA tenderness, guarding or rebound.      Hernia: No hernia is present.   Musculoskeletal:         General: Normal range of motion.      Cervical back: Normal range of motion and neck supple.   Lymphadenopathy:      Cervical: No cervical adenopathy.   Skin:     General: Skin is warm and dry.      Findings: No erythema.   Neurological:      Mental Status: She is alert and oriented to person, place, and time.   Psychiatric:         Mood and Affect: Mood normal.         Behavior: Behavior normal.         Thought Content: Thought content normal.         Judgment: Judgment normal.               Assessment:     Sravanthi Kauffman is a 42 y.o. year old  female with medically complicated severe obesity with a BMI of Body mass index is 46.08 kg/m². and multiple co-morbidities listed in the encounter diagnosis.     I think  she is an appropriate candidate for this surgery, and is ready to proceed.        Plan/Discussion/Summary:  No hiatal hernia per me.  Patient status post laparoscopic sleeve gastrectomy 2012 in [place].  Does not know if hiatal hernia repaired.  No PPI.  H. pylori negative.  Patient with history of anemia without the actual etiology.  She understands increased risk of iron deficiency after gastric bypass because of malabsorption.  I instructed her that this may worsen her anemia.  I did instruct her that she has to be on bariatric vitamins for better absorption which he understands.  Patient understands that they are at an increased risk for complications because of this being a revisional surgery/conversion  to another procedure.  The patient understands the increased risk of gastric injury/leak, bleeding, etc because of the scarring and previous surgery.  Also increased risk of other complications that are listed because of increased OR time.     The patient has returned to the office for a surgical consultation and has requested to proceed with the Divya-en-Y  gastric bypass.  I have had the opportunity to obtain the history, examine the patient and review the patient's chart.  The procedure could be done laparoscopically and or robotically.     The patient understands that surgery is a tool and that weight loss is not guaranteed but only seen in the context of appropriate use, regular follow up, exercise and making appropriate food choices.       I have personally discussed the potential complications of the laparoscopic gastric bypass with this patient.  The patient is well aware of the potential complications of the surgery that include but not limited to bleeding, infections, deep venous thrombosis, pulmonary embolism, pulmonary  complications such as pneumonia, cardiac events, hernias, small bowel obstruction, damage to the spleen or other organs, bowel injury, disfiguring scars, failure to lose weight, need for additional surgery, conversion to an open procedure and death.  I also discussed the risks that apply in particular to the gastric bypass such as the leaking of stomach and/or intestinal contents at the staple or suture line, the development of an intra-abdominal abscess,  strictures, ulcers, and vitamin/mineral deficiencies.  The patient was strongly advised to avoid smoking and the use of non-steroidal anti-inflammatory drugs such as ibuprofen, Motrin and Advil in the postoperative period and understands the increased risk of ulcer formation, perforation, death if they did not stop the use of these medications/substances.      The risks, benefits, potential complications and alternative therapies were discussed at great lengths as outlined in our extensive consent forms, online consent, and educational teaching processes.       The patient has confirmed participation in the program's extensive educational activities.       All questions and concerns were answered to the patient's satisfaction.  The patient now wishes to proceed with surgery.     The patient agreed to a postoperative course of anticoagulant therapy.     I instructed patient that the surgery could be laparoscopically and/or robotically.     I instructed patient to start on a H2 blocker or proton pump inhibitor if not already on one of these medications.     I explained in detail the procedures that are in the consent.  All of these procedures have a chance to convert to open if any technical challenges or complications do occur.  Bariatric surgery is not cosmetic surgery but rather a tool to help a patient make a life-long commitment lifestyle change including diet, exercise, behavior changes, and taking supplemental vitamins and minerals.     Problems after surgery  may require more operations to correct them.     The risks, benefits, alternatives, and potential complications of all of the procedures were explained in detail including, but not limited to death, anesthesia and medication adverse effect, deep venous thrombosis, pulmonary embolism, trocar site/incisional hernia, wound infection, abdominal infection, bleeding, failure to lose weight, gain weight, a change in body image, metabolic complications with vitamin deficiences and anemia.     Weight loss expectations were discussed with the patient in detail. The weight loss operations most commonly performed are the sleeve gastrectomy and the Divya-en-Y gastric bypass. These operations result in weight losses up to approximately 25-35% of initial body weight 12 to 24 months after surgery with the gastric bypass usually the higher percent of weight loss but depends on patient using the tool.     For the gastric bypass and loop duodenal switch (SEEMA-S) the risks include but not limited to the following early complications:  Anastomotic leak/peritonitis, Divya/Alimentary/biliopancreatic limb obstruction, severe & minor wound infection/seroma, and nausea/vomiting.  Late complications can include but are not limited to malnutrition, vitamin deficiencies, frequent loose stools,  stomal stenosis, marginal ulcer, bowel obstruction, intussusception, internal, and incisional hernia.     Regarding the gastric sleeve, there is higher risk of dysphagia and reflux leading to possible Ware's esophagus compared to a gastric bypass, as well as risk of internal visceral/organ injury, splenectomy, bleeding, infection, leak (which could require further intervention possible conversion to Divya-en-Y gastric bypass), stenosis and possibility of regaining weight.     Sravanthi was counseled regarding diagnostic results, instructions for management, risk factor reductions, prognosis, patient and family education, impressions, risks and benefits of  treatment options and importance of compliance with treatment. Total time of the encounter was over 45 minutes counseling the patient regarding the procedure as above and reviewing as well as ordering labs, medications and the procedure.  The chart was also reviewed prior to seeing the patient reviewing previous testing, studies and labs.     Sravanthi understands the surgical procedures and the different surgical options that are available.   She understands the lifestyle changes that are required after surgery and has agreed to follow the guidelines outlined in the weight management program.   She also expressed understanding of the risks involved and had all of female questions answered and desires to proceed.            Electronically signed by Vinicio Arango Jr., MD at 09/12/22 5981          Operative/Procedure Notes (all)      Vinicio Arango Jr., MD at 09/12/22 100  Version 1 of 1       PREOPERATIVE DIAGNOSIS:  Morbid obesity with multiple comorbidities as referenced in the most recent history and physical.  Patient status post laparoscopic Sleeve Gastrectomy and now presents for revision    POSTOPERATIVE DIAGNOSIS:  Morbid obesity with multiple comorbidities as referenced in the most recent history and physical.  Status post laparoscopic Sleeve Gastrectomy with adhesions.  Retained fundus    PROCEDURES PERFORMED:  1.  Laparoscopic antecolic antegastric Divya-en-Y divided gastric bypass conversion from previous Sleeve Gastrectomy  2.  Laparoscopic partial gastrectomy  2.  Extensive laparoscopic lysis of adhesions  3.  Tisseel application    Approximately 40 minutes was spent taking down the gastric plication as well as the extensive adhesions    SURGEON: Vinicio Arango Jr., MD    ASSISTANT: [facility]      Surgery assisted and facilitated by a certified physician assistant, who directly resulted in a decreased operative time, anesthetic time, wound exposure, and possibly of an operative  wound infection, thereby decreasing patient morbidity and ultimately total expenditures.    ANESTHESIA: General endotracheal and TAP block with Ropivacaine mixture    ESTIMATED BLOOD LOSS:  Less than 25 mL unless dictated below.    SPECIMENS:  None.    DRAINS:  none    COUNTS:  Correct.    COMPLICATIONS:  None.      INDICATIONS:   This patient presents for elective laparoscopic Divya-en-Y divided gastric bypass. The patient has undergone our extensive preoperative evaluation, teaching and consent process.       DESCRIPTION OF PROCEDURE:  The patient was brought to the operating room and placed in the supine position on the operating room table. The patient had previously received subcutaneous Lovenox and IV antibiotics as well as SCD hose. The patient underwent uneventful general endotracheal anesthesia per the Anesthesiology staff. The abdomen was prepped with ChloraPrep and draped in the usual sterile fashion. An Ioban was used as well if not allergic.     A 1 cm transverse incision was made to the left of midline in the upper abdomen and the peritoneal cavity entered under direct camera visualization using a 10 mm 0° laparoscope and an Optiview trocar. The abdomen was insufflated to a pressure of 15 mmHg with C02 gas. A 10 mm angled laparoscope was then used. Exploratory laparoscopy revealed no evidence of injury from the entrance technique and otherwise no abnormalities unless addendum dictated below. Under direct camera visualization, a 12 mm trocar was placed in the right lateral subcostal position and a 12 mm trocar was placed in the left lateral subcostal position. Two 5 mm trocars were placed, one left mid abdomen and one in the right mid abdomen.  At the trocar sites the skin and deeper tissues were infiltrated with local anesthetic.     Patient did have extensive adhesions in the upper abdomen from previous sleeve gastrectomy.  Prior to placing the liver retractor the adhesions from the stomach to the left  lobe of the liver were taking down using electrocautery, scissors and harmonic device.     I then chose an area along the lesser curve of the stomach approximately 6 centimeters distally to the GE junction and began dissection with the harmonic scalpel.  I either entered the retrogastric space using careful dissection with the harmonic scalpel or using pars flaccida with a white load 60 mm.  I then placed a  60 mm green load with frank-strip in this area for a horizontal staple line to form the gastric pouch which was measured from the GE junction.    I divided the omentum with the vessel sealer starting with the area near the transverse colon and worked my way superiorly to make way for the Divya limb.     I then identified the ligament of Treitz and ran the bowel distally 75 cm and brought this loop up to the gastric pouch.  With the proximal biliary limb on the patient left side and the distal alimentary limb on the right side, I performed a linear gastrojejunostomy by making a gastrotomy in the gastric pouch on the posterior side near the staple line in the midline of this region and then also made an enterotomy in the jejunum.  I used a 60 mm [place] stapler with a blue load and made the staple line 30 mm.  I was then able to inspect the staple line and there was no bleeding.  I then closed the enterotomy in two layers with a running 2-0 absorbable Vicryl suture x 2.      I then divided the bowel using a white load with frank strip and fired it across the jejunum just to the left of the gastrojejunostomy and then at that point the limb on the patient's left side was the biliopancreatic limb.      After this I ran the Divya limb distally 150 cm and then performed a side-to-side anastomosis with a white load using the 60 mm [place] stapler.  The common enterotomy was then closed with 2-0 Vicryl suture in a running fashion in 2 layers.    At this point I performed a leak test.  The anastomosis was submerged under  saline and the area was insufflated with air.  No air bubbles were seen unless dictated below.  The bougie was also passed across the stoma without difficulty.  The irrigation fluid was suctioned out.    I then closed the mesenteric defect at the jejunojejunostomy with a running 2-0 nonabsorbable silk suture and I also closed Valdes space with a running 2-0 nonabsorbable silk suture.      Tisseel was sprayed over the gastrojejunostomy and also the mesentery between the distal biliary limb and the gastrojejunostomy.  The liver retractor was removed.    Then the abdomen was desufflated and all the trochars were removed under direct visualization.  No bleeding was noted.  All incisions were infiltrated with the local anesthetic.  All the skin incisions were closed with 4-0 Monocryl and surgical glue.    The patient did have retained fundus.  The short gastric vessels were not taken down during original sleep.  Short gastrics were taken down using the harmonic scalpel.  The fundus and part of the greater curve trocar was divided with the [place] 60 stapler with gold load with EDDI strip.  The stomach was then removed through the 12 mm trocar site.    All sponges, needles, and instruments were counted and were correct.    The hiatus was checked for a hernia and no hernia was detected.      Electronically signed by Urmila Arango Jr., MD at 09/12/22 113       Discharge Summary    No notes of this type exist for this encounter.         Discharge Order (From admission, onward)     Start     Ordered    09/13/22 0848  Discharge patient  Once        Expected Discharge Date: 09/13/22    Discharge Disposition: Home or Self Care    Physician of Record for Attribution - Please select from Treatment Team: URMILA ARANGO JR [[id number]]    Review needed by CMO to determine Physician of Record: No       Question Answer Comment   Physician of Record for Attribution - Please select from Treatment Team URMILA ARANGO JR     Review needed by CMO to determine Physician of Record No        09/13/22 0881

## 2022-09-14 NOTE — OUTREACH NOTE
Call Center TCM Note    Flowsheet Row Responses   [facility], [place], operated by [facility] patient discharged from? [place]   Does the patient have one of the following disease processes/diagnoses(primary or secondary)? General Surgery   TCM attempt successful? No   Unsuccessful attempts Attempt 2          Nidhi Interiano RN    9/14/2022, 16:27 EDT

## 2022-09-14 NOTE — OUTREACH NOTE
Call Center TCM Note    Flowsheet Row Responses   [facility] patient discharged from? [place]   Does the patient have one of the following disease processes/diagnoses(primary or secondary)? General Surgery   TCM attempt successful? No  [no verbal release]   Unsuccessful attempts Attempt 1          Daniela Langford RN    9/14/2022, 14:24 EDT

## 2022-09-14 NOTE — DISCHARGE SUMMARY
"Discharge Summary    Patient name: Sravanthi Kauffman    Medical record number: [id number]    Admission date: 9/12/2022  Discharge date:  9/13/2022    Attending physician: Dr. Vinicio Arango    Primary care physician: Cindy Loyola APRN    Referring physician: Vinicio Arango Jr., MD  [address] [place] [address]    Condition on discharge: Stable    Primary Diagnoses:  Morbid obesity with co-morbidities    Operative Procedure:  Laparoscopic RNY gastric bypass conversion from sleeve gastrectomy, partial gastrectomy, AMBROSE     Sravanthi Kauffman  is post op day one status post procedure listed. Patient denies shortness of air and lower extremity pain. Feels better than yesterday. No vomiting this am. Ambulating well and using incentive spirometer.          /87 (BP Location: Left arm, Patient Position: Lying)   Pulse 58   Temp 99.3 °F (37.4 °C) (Oral)   Resp 16   Ht 160 cm (62.99\")   Wt 117 kg (258 lb 6.1 oz)   LMP 08/17/2022   SpO2 97%   BMI 45.78 kg/m²     General:  alert, appears stated age and cooperative   Abdomen: soft, bowel sounds active, appropriate tenderness   Incision:   healing well, no drainage, no erythema, no hernia, no seroma, no swelling, no dehiscence, incision well approximated   Heart: Regular rate   Lungs: Clear to auscultation bilaterally     I reviewed the patient's new clinical results.     Lab Results (last 24 hours)     Procedure Component Value Units Date/Time    Tissue Pathology Exam [[id number]] Collected: 09/12/22 1108    Specimen: Tissue from Stomach Updated: 09/13/22 0906     Case Report --     Surgical Pathology Report                         Case: [id number]                                  Authorizing Provider:  Vinicio Arango Jr.,   Collected:           09/12/2022 11:08 AM                                 MD                                                                           Ordering Location:     [facility]  Received:           " " 09/12/2022 12:34 PM                                 [facility] OR                                                                       Pathologist:           Sofya Alvares MD                                                          Specimen:    Stomach, PORTION OF STOMACH                                                                 Final Diagnosis --     1. Stomach, Laparoscopic Sleeve Gastrectomy:   A. Portion of stomach with no significant histopathologic changes.   B. Negative for H. pylori-type organisms on routine stain.         Gross Description --     1. Stomach.  Received in formalin labeled \"portion of stomach\" is a small partial gastrectomy specimen with a 7.5 cm long greater curvature and a 6.5 cm single staple line margin. The serosa is smooth tan-pink with a minimal amount of adherent adipose tissue  The mucosa is tan-pink and glistening with normal rugae and the wall thickness averages 0.3 cm. Representative sections are submitted as 1A-1C.  jap/uso/clm      Comprehensive Metabolic Panel [[id number]]  (Abnormal) Collected: 09/13/22 0747    Specimen: Blood Updated: 09/13/22 0821     Glucose 101 mg/dL      BUN 8 mg/dL      Creatinine 0.62 mg/dL      Sodium 137 mmol/L      Potassium 4.0 mmol/L      Chloride 105 mmol/L      CO2 20.0 mmol/L      Calcium 9.2 mg/dL      Total Protein 6.9 g/dL      Albumin 3.60 g/dL      ALT (SGPT) 73 U/L      AST (SGOT) 39 U/L      Alkaline Phosphatase 63 U/L      Total Bilirubin 0.4 mg/dL      Globulin 3.3 gm/dL      A/G Ratio 1.1 g/dL      BUN/Creatinine Ratio 12.9     Anion Gap 12.0 mmol/L      eGFR 113.5 mL/min/1.73      Comment: National Kidney Foundation and American Society of Nephrology (ASN) Task Force recommended calculation based on the Chronic Kidney Disease Epidemiology Collaboration (CKD-EPI) equation refit without adjustment for race.       Narrative:      GFR Normal >60  Chronic Kidney Disease <60  Kidney Failure <15      Phosphorus [[id number]]  (Normal) " Collected: 09/13/22 0747    Specimen: Blood Updated: 09/13/22 0820     Phosphorus 3.7 mg/dL     Magnesium [[id number]]  (Normal) Collected: 09/13/22 0747    Specimen: Blood Updated: 09/13/22 0820     Magnesium 2.0 mg/dL     CBC & Differential [[id number]]  (Abnormal) Collected: 09/13/22 0747    Specimen: Blood Updated: 09/13/22 0802    Narrative:      The following orders were created for panel order CBC & Differential.  Procedure                               Abnormality         Status                     ---------                               -----------         ------                     CBC Auto Differential[[id number]]        Abnormal            Final result                 Please view results for these tests on the individual orders.    CBC Auto Differential [[id number]]  (Abnormal) Collected: 09/13/22 0747    Specimen: Blood Updated: 09/13/22 0802     WBC 7.75 10*3/mm3      RBC 3.86 10*6/mm3      Hemoglobin 11.6 g/dL      Hematocrit 36.0 %      MCV 93.3 fL      MCH 30.1 pg      MCHC 32.2 g/dL      RDW 14.6 %      RDW-SD 50.6 fl      MPV 11.6 fL      Platelets 155 10*3/mm3      Neutrophil % 77.2 %      Lymphocyte % 11.1 %      Monocyte % 11.5 %      Eosinophil % 0.0 %      Basophil % 0.1 %      Immature Grans % 0.1 %      Neutrophils, Absolute 5.98 10*3/mm3      Lymphocytes, Absolute 0.86 10*3/mm3      Monocytes, Absolute 0.89 10*3/mm3      Eosinophils, Absolute 0.00 10*3/mm3      Basophils, Absolute 0.01 10*3/mm3      Immature Grans, Absolute 0.01 10*3/mm3      nRBC 0.0 /100 WBC              Assessment:      Doing well postoperatively.      Plan:   1. Continue Stage 1 diet  2. Continue with ambulation and Incentive spirometry  3. Plan for d/c home    Patient was seen and examined by Dr. Arango.    Hospital Course: The patient is a very pleasant 42 y.o. female that was admitted to the hospital with morbid obesity with comorbidities who underwent the above mentioned procedure without complication.  Postoperatively  the patient was then transferred to the bariatric unit per protocol.  The patient was afebrile with vital signs stable.  They were ambulating well and using the incentive spirometer.  POD 1 the patient was started on bariatric diet which they tolerated without difficulty.  Patient was then discharged home to follow up as an outpatient.      Discharge medications:      Discharge Medications      New Medications      Instructions Start Date   Enoxaparin Sodium 40 MG/0.4ML solution prefilled syringe syringe  Commonly known as: LOVENOX  Notes to patient: Next dose due: tonight   40 mg, Subcutaneous, Every 12 Hours Scheduled      HYDROmorphone 2 MG tablet  Commonly known as: Dilaudid  Notes to patient: Next dose available at any time   2 mg, Oral, Every 4 Hours PRN      ondansetron ODT 4 MG disintegrating tablet  Commonly known as: ZOFRAN-ODT  Notes to patient: Next dose available at any time   4 mg, Translingual, Every 4 Hours PRN      pantoprazole 40 MG EC tablet  Commonly known as: Protonix  Notes to patient: Next dose due: tomorrow   40 mg, Oral, Daily      sucralfate 1 g tablet  Commonly known as: Carafate  Notes to patient: Next dose due: tonight   1 g, Oral, 4 Times Daily         Changes to Medications      Instructions Start Date   topiramate 25 MG tablet  Commonly known as: TOPAMAX  What changed:   · when to take this  · reasons to take this  · additional instructions  Notes to patient: Next dose due: tonight   25 mg, Oral, Nightly         Continue These Medications      Instructions Start Date   cimetidine 200 MG tablet  Commonly known as: TAGAMET  Notes to patient: Next dose due: tomorrow   200 mg, Oral, Daily      FeroSul 325 (65 FE) MG tablet  Generic drug: ferrous sulfate  Notes to patient: Next dose due: tomorrow   325 mg, Oral, Daily With Breakfast      folic acid-vit B6-vit B12 2.5-25-1 MG tablet tablet  Commonly known as: FOLBEE  Notes to patient: Next dose due: tomorrow   1 tablet, Oral, Daily       MELATONIN PO  Notes to patient: Next dose due: tonight   1 tablet, Oral, Nightly      methocarbamol 750 MG tablet  Commonly known as: ROBAXIN  Notes to patient: Next dose available at any time   750 mg, Oral, 4 Times Daily PRN      multivitamin with minerals tablet tablet  Notes to patient: Next dose due: tomorrow   1 tablet, Oral, Daily      ursodiol 300 MG capsule  Commonly known as: Actigall  Notes to patient: Next dose due: tonight   300 mg, Oral, 2 Times Daily         Stop These Medications    Chlorhexidine Gluconate 2 % pads            Discharge instructions:  Per Bariatric manual; per our protocol      Follow-up appointment: Follow up with Dr. Arango in the office as scheduled.  If not already scheduled call for appointment at [phone].

## 2022-09-15 ENCOUNTER — TRANSITIONAL CARE MANAGEMENT TELEPHONE ENCOUNTER (OUTPATIENT)
Dept: CALL CENTER | Facility: HOSPITAL | Age: 42
End: 2022-09-15

## 2022-09-15 ENCOUNTER — OFFICE VISIT (OUTPATIENT)
Dept: BARIATRICS/WEIGHT MGMT | Facility: CLINIC | Age: 42
End: 2022-09-15

## 2022-09-15 VITALS — TEMPERATURE: 97.1 F | WEIGHT: 251 LBS | HEIGHT: 63 IN | RESPIRATION RATE: 18 BRPM | BODY MASS INDEX: 44.47 KG/M2

## 2022-09-15 DIAGNOSIS — Z71.3 DIETARY COUNSELING: ICD-10-CM

## 2022-09-15 DIAGNOSIS — E66.01 OBESITY, CLASS III, BMI 40-49.9 (MORBID OBESITY): Primary | ICD-10-CM

## 2022-09-15 DIAGNOSIS — Z98.84 HISTORY OF ROUX-EN-Y GASTRIC BYPASS: ICD-10-CM

## 2022-09-15 PROCEDURE — 99024 POSTOP FOLLOW-UP VISIT: CPT | Performed by: NURSE PRACTITIONER

## 2022-09-15 NOTE — OUTREACH NOTE
Call Center TCM Note    Flowsheet Row Responses   [facility] patient discharged from? [place]   Does the patient have one of the following disease processes/diagnoses(primary or secondary)? General Surgery   TCM attempt successful? No  [No verbal release on file]   Unsuccessful attempts Attempt 3          Annia Cornejo RN    9/15/2022, 11:07 EDT

## 2022-09-16 NOTE — PROGRESS NOTES
[facility] BARIATRIC SURGERY  [address]  [facility] [address]  [phone]  [address] [facility] [address]  [facility] [id number] [phone]  Dept: [phone]  9/16/2022      Sravanthi Kauffman.  [id number]  [id number]  1980  female      Chief Complaint   Patient presents with   • Post-op     1 week post op RNY       BH Post-Op Bariatric Surgery:   Sravanthi Kauffman is status post laparopscopic Laparoscopic RNY Bypass procedure, performed on 9/12/2022.     HPI:   Today's weight is 114 kg (251 lb) pounds, today's BMI is Body mass index is 44.14 kg/m².,@ has a  loss of 11 pounds since surgery. The patient reports a decreased portion size and loss of appetite.  Sravanthi Kauffman denies nausea, vomiting, reflux, dysphagia and reports tolerating clears.  She is getting 1 protein shake and 1 bottle of water per day.  The patient c/o appropriate post-op incisional discomfort that is improving. she is doing well with protein and water intake so far. Taking their vitamins, walking and using IS. Denies fevers, chills, chest pain or shortness of air.      Diet and Exercise: Diet history reviewed and discussed with the patient. Weight loss/gains to date discussed with the patient. No carbonated beverage consumption and exercising regularly- walking frequently.   Supplements: multivitamins, B-12, calcium, iron, B-1 and Vitamin D.   Patient is taking blood thinner as prescribed: Lovenox  Current outpatient and discharge medications have been reconciled for the patient.  Reviewed by: TARIK Dillon        Review of Systems   Constitutional: Positive for appetite change and fatigue.   Respiratory: Negative for shortness of breath.    Cardiovascular: Negative for palpitations.   Gastrointestinal: Positive for abdominal pain.   All other systems reviewed and are negative.      Patient Active Problem List   Diagnosis   • Constipation   • Shoulder pain   • Hip pain   • Back  pain   • Carpal tunnel syndrome   • Anemia   • Obesity, Class III, BMI 40-49.9 (morbid obesity) ([facility])   • History of Divya-en-Y gastric bypass   • Numbness and tingling   • APC (atrial premature contractions)   • Dietary counseling       The following portions of the patient's history were reviewed and updated as appropriate: allergies, current medications, past medical history, past surgical history and problem list.    Vitals:    09/15/22 1128   Resp: 18   Temp: 97.1 °F (36.2 °C)       Physical Exam  Vitals reviewed.   Constitutional:       General: She is awake. She is not in acute distress.     Appearance: She is morbidly obese.   HENT:      Head: Normocephalic and atraumatic.      Mouth/Throat:      Mouth: Mucous membranes are moist.   Eyes:      General: No scleral icterus.     Extraocular Movements: Extraocular movements intact.      Conjunctiva/sclera: Conjunctivae normal.      Pupils: Pupils are equal, round, and reactive to light.   Cardiovascular:      Rate and Rhythm: Normal rate and regular rhythm.      Heart sounds: Normal heart sounds.   Pulmonary:      Effort: Pulmonary effort is normal. No respiratory distress.      Breath sounds: Normal breath sounds.   Abdominal:      General: Abdomen is flat. Bowel sounds are normal. There is no distension.      Palpations: Abdomen is soft.      Tenderness: There is no abdominal tenderness. There is no guarding.      Comments: Incisions clean, dry, intact; no erythema   Musculoskeletal:         General: Normal range of motion.      Cervical back: Normal range of motion and neck supple.   Skin:     General: Skin is warm and dry.   Neurological:      General: No focal deficit present.      Mental Status: She is alert and oriented to person, place, and time.   Psychiatric:         Mood and Affect: Mood normal.         Behavior: Behavior normal. Behavior is cooperative.         Thought Content: Thought content normal.         Judgment: Judgment normal.          Assessment:   Post-op, the patient is doing well.     Encounter Diagnoses   Name Primary?   • Obesity, Class III, BMI 40-49.9 (morbid obesity) (HCC) Yes   • History of Divya-en-Y gastric bypass    • Dietary counseling        Plan:   Increase fluids as tolerated.  Reviewed proper postoperative diet.  Questions answered.  Reviewed with patient the importance of following the manual for diet progression. Increase activity as tolerated. Continue increasing daily intake of protein and water.   Return to work: the patient is to return to 3 weeks from their surgery date with no restrictions unless they develop medical problems in which we will see them back in the office. They received a note in our office today with their return to work date.  Activity restrictions: no lifting, pushing or pulling over 25lbs for 3 weeks.   Recommended patient be sure to get at least 70 grams of protein per day. Discussed with the patient the recommended amount of water per day to intake. Reviewed vitamin requirements. Be sure to do routine exercise and increase activity as tolerated. No asa, nsaids or steroids for 8 weeks if gastric sleeve procedure and lifelong if gastric bypass procedure.. Patient may use miralax as needed if necessary.     Instructions / Recommendations: dietary counseling recommended, recommended a daily protein intake of  grams, vitamin supplement(s) recommended, recommended exercising at least 150 minutes per week, behavior modifications recommended and instructed to call the office for concerns, questions, or problems.     The patient was instructed to follow up at one month follow up appt.     The patient was counseled regarding post op bariatric manual

## 2022-09-20 DIAGNOSIS — M54.31 RIGHT SIDED SCIATICA: ICD-10-CM

## 2022-09-20 DIAGNOSIS — M54.18 RIGHT SACRAL RADICULOPATHY: ICD-10-CM

## 2022-09-20 NOTE — TELEPHONE ENCOUNTER
Rx Refill Note  Requested Prescriptions     Pending Prescriptions Disp Refills   • meloxicam (MOBIC) 15 MG tablet [Pharmacy Med Name: MELOXICAM 15MG TABLETS] 21 tablet 0     Sig: TAKE 1 TABLET BY MOUTH DAILY AS NEEDED FOR MODERATE PAIN      Last office visit with prescribing clinician: 4/25/2022      Next office visit with prescribing clinician: Visit date not found            Jair Tanner MA  09/20/22, 10:13 EDT

## 2022-09-21 RX ORDER — MELOXICAM 15 MG/1
15 TABLET ORAL DAILY PRN
Qty: 21 TABLET | Refills: 0 | OUTPATIENT
Start: 2022-09-21

## 2022-11-04 RX ORDER — TOPIRAMATE 25 MG/1
25 TABLET ORAL NIGHTLY
Qty: 30 TABLET | Refills: 0 | Status: SHIPPED | OUTPATIENT
Start: 2022-11-04

## 2023-01-17 ENCOUNTER — OFFICE VISIT (OUTPATIENT)
Dept: BARIATRICS/WEIGHT MGMT | Facility: CLINIC | Age: 43
End: 2023-01-17
Payer: MEDICAID

## 2023-01-17 VITALS
BODY MASS INDEX: 38.27 KG/M2 | DIASTOLIC BLOOD PRESSURE: 81 MMHG | TEMPERATURE: 97.7 F | HEART RATE: 78 BPM | HEIGHT: 63 IN | WEIGHT: 216 LBS | SYSTOLIC BLOOD PRESSURE: 123 MMHG

## 2023-01-17 DIAGNOSIS — Z71.3 DIETARY COUNSELING: ICD-10-CM

## 2023-01-17 DIAGNOSIS — D50.8 OTHER IRON DEFICIENCY ANEMIA: ICD-10-CM

## 2023-01-17 DIAGNOSIS — Z98.84 HISTORY OF ROUX-EN-Y GASTRIC BYPASS: ICD-10-CM

## 2023-01-17 DIAGNOSIS — E83.51 HYPOCALCEMIA: ICD-10-CM

## 2023-01-17 DIAGNOSIS — R20.2 NUMBNESS AND TINGLING: ICD-10-CM

## 2023-01-17 DIAGNOSIS — R20.0 NUMBNESS AND TINGLING: ICD-10-CM

## 2023-01-17 DIAGNOSIS — M54.9 CHRONIC BACK PAIN, UNSPECIFIED BACK LOCATION, UNSPECIFIED BACK PAIN LATERALITY: ICD-10-CM

## 2023-01-17 DIAGNOSIS — E66.9 OBESITY, CLASS II, BMI 35-39.9: Primary | ICD-10-CM

## 2023-01-17 DIAGNOSIS — G89.29 CHRONIC BACK PAIN, UNSPECIFIED BACK LOCATION, UNSPECIFIED BACK PAIN LATERALITY: ICD-10-CM

## 2023-01-17 DIAGNOSIS — R53.82 CHRONIC FATIGUE: ICD-10-CM

## 2023-01-17 PROBLEM — E66.01 OBESITY, CLASS III, BMI 40-49.9 (MORBID OBESITY) (HCC): Status: RESOLVED | Noted: 2021-08-20 | Resolved: 2023-01-17

## 2023-01-17 PROBLEM — E66.812 OBESITY, CLASS II, BMI 35-39.9: Status: ACTIVE | Noted: 2023-01-17

## 2023-01-17 PROCEDURE — 99213 OFFICE O/P EST LOW 20 MIN: CPT | Performed by: NURSE PRACTITIONER

## 2023-01-17 RX ORDER — PANTOPRAZOLE SODIUM 40 MG/1
40 TABLET, DELAYED RELEASE ORAL DAILY
Qty: 30 TABLET | Refills: 5 | Status: SHIPPED | OUTPATIENT
Start: 2023-01-17

## 2023-01-17 NOTE — PROGRESS NOTES
[facility] BARIATRIC SURGERY  [address] [facility] [address]  [facility] [address]  [phone]  [address] [id number] [phone]  Dept: [phone]  1/17/2023      Sravanthi Kauffman.  [id number]  [id number]  1980  female      Chief Complaint   Patient presents with   • Follow-up     4 mo fup RNY        Post-Op Bariatric Surgery:   Sravanthi Kauffman is status post Laparoscopic RNY Bypass Revision from Sleeve procedure, performed on 9/12/2022     HPI:   Today's weight is 98 kg (216 lb) pounds, today's BMI is Body mass index is 37.99 kg/m².,has a  loss of 35 pounds since the last visit and weight loss since surgery is 46 pounds. The patient reports a decreased portion size and loss of appetite.      Sravanthi Kauffman denies nausea, vomiting, reflux, dysphagia and reports tolerating regular diet.  Was dirnking 2 shakes per day but has decreased to 1 as her protein portion size has increased.       Diet and Exercise: Diet history reviewed and discussed with the patient. Weight loss/gains to date discussed with the patient. The patient states they are eating 70 grams of protein per day. She reports eating 3 meals per day, a typical portion size of 1/2 cup, eating 2 snacks per day, drinking 5 or more 8-oz. glasses of water per day, no carbonated beverage consumption and exercising regularly.     Supplements: mtv, iron, b12.     Review of Systems   Constitutional: Positive for activity change and appetite change.   All other systems reviewed and are negative.      Patient Active Problem List   Diagnosis   • Constipation   • Shoulder pain   • Hip pain   • Back pain   • Carpal tunnel syndrome   • Anemia   • History of Divya-en-Y gastric bypass   • Numbness and tingling   • APC (atrial premature contractions)   • Dietary counseling   • Hypocalcemia   • Chronic fatigue   • Obesity, Class II, BMI 35-39.9       Past Medical History:   Diagnosis Date   • Anemia     • Anxiety    • Back pain    • Constipation    • History of heart murmur in childhood    • History of miscarriage    • Obesity    • Osteoarthritis of left knee    • Sleep difficulties    • SOB (shortness of breath) on exertion        The following portions of the patient's history were reviewed and updated as appropriate: allergies, current medications, past medical history, past surgical history and problem list.    Vitals:    01/17/23 1148   BP: 123/81   Pulse: 78   Temp: 97.7 °F (36.5 °C)       Physical Exam  Vitals reviewed.   Constitutional:       General: She is not in acute distress.     Appearance: Normal appearance. She is obese.   HENT:      Head: Normocephalic and atraumatic.      Mouth/Throat:      Mouth: Mucous membranes are moist.      Pharynx: Oropharynx is clear.   Eyes:      General: No scleral icterus.     Extraocular Movements: Extraocular movements intact.      Conjunctiva/sclera: Conjunctivae normal.      Pupils: Pupils are equal, round, and reactive to light.   Cardiovascular:      Rate and Rhythm: Normal rate and regular rhythm.      Heart sounds: Normal heart sounds. No murmur heard.    No gallop.   Pulmonary:      Effort: Pulmonary effort is normal. No respiratory distress.   Abdominal:      General: Bowel sounds are normal.      Palpations: Abdomen is soft.   Musculoskeletal:         General: Normal range of motion.      Cervical back: Normal range of motion and neck supple.   Skin:     General: Skin is warm and dry.   Neurological:      General: No focal deficit present.      Mental Status: She is alert and oriented to person, place, and time.   Psychiatric:         Mood and Affect: Mood normal.         Behavior: Behavior normal.         Thought Content: Thought content normal.         Judgment: Judgment normal.         Assessment:   Post-op, the patient is doing well.     Encounter Diagnoses   Name Primary?   • Obesity, Class II, BMI 35-39.9 Yes   • History of Divya-en-Y gastric bypass     • Hypocalcemia    • Other iron deficiency anemia    • Chronic back pain, unspecified back location, unspecified back pain laterality    • Numbness and tingling    • Chronic fatigue    • Dietary counseling        Plan:   Will get labs.  Her goal weight is 175#.  12 month goal 201#s  Encouraged patient to be sure to get plenty of lean protein per day through small frequent meals all with a protein source.   Activity restrictions: none.   Recommended patient be sure to get at least 70 grams of protein per day by eating small, frequent meals all with high lean protein choices. Be sure to limit/cut back on daily carbohydrate intake. Discussed with the patient the recommended amount of water per day to intake- half of body weight in ounces. Reviewed vitamin requirements. Be sure to do routine exercise, 150 minutes per week minimum, including both cardio and strength training.     Instructions / Recommendations: dietary counseling recommended, recommended a daily protein intake of  grams, vitamin supplement(s) recommended, recommended exercising at least 150 minutes per week, behavior modifications recommended and instructed to call the office for concerns, questions, or problems.     The patient was instructed to follow up in 3 months .     Total time spent during this encounter today was 20 minutes.

## 2023-04-02 ENCOUNTER — TELEMEDICINE (OUTPATIENT)
Dept: FAMILY MEDICINE CLINIC | Facility: TELEHEALTH | Age: 43
End: 2023-04-02
Payer: MEDICAID

## 2023-04-02 DIAGNOSIS — A09 TRAVELER'S DIARRHEA: Primary | ICD-10-CM

## 2023-04-02 PROCEDURE — 99213 OFFICE O/P EST LOW 20 MIN: CPT | Performed by: NURSE PRACTITIONER

## 2023-04-02 RX ORDER — AZITHROMYCIN 500 MG/1
500 TABLET, FILM COATED ORAL DAILY
Qty: 3 TABLET | Refills: 0 | Status: SHIPPED | OUTPATIENT
Start: 2023-04-02 | End: 2023-04-05

## 2023-04-02 NOTE — PROGRESS NOTES
Subjective   Chief Complaint   Patient presents with   • Diarrhea              Sravanthi Kauffman is a 43 y.o. female.     History of Present Illness  Patient reports recently returning from travel to [facility].  She states she has had mild cramping and diarrhea for the past 3 to 4 days.  Initially diarrhea was very watery and yellow, stool is now back to usual brown color. She has been taking Imodium regularly which has seemed to thicken the stool some symptoms are not resolving. She continues to have 5-10 bowel movements a day with mild cramping.  Denies fever, nausea, vomiting.   Diarrhea   This is a new problem. Episode onset: 3-4 days. The problem occurs 5 to 10 times per day. The problem has been gradually improving. Diarrhea characteristics: soft. Associated symptoms include abdominal pain. Pertinent negatives include no arthralgias, bloating, chills, coughing, fever, headaches, increased  flatus, myalgias, sweats, URI, vomiting or weight loss. Risk factors include travel to endemic area. She has tried anti-motility drug for the symptoms. The treatment provided mild relief.        Allergies   Allergen Reactions   • Latex Itching       Past Medical History:   Diagnosis Date   • Anemia    • Anxiety    • Back pain    • Constipation    • History of heart murmur in childhood    • History of miscarriage    • Obesity    • Osteoarthritis of left knee    • Sleep difficulties    • SOB (shortness of breath) on exertion        Past Surgical History:   Procedure Laterality Date   •  SECTION  1998   • ENDOSCOPY N/A 2022    Procedure: ESOPHAGOGASTRODUODENOSCOPY WITH BIOPSY;  Surgeon: Vinicio Arango Jr., MD;  Location: [facility] ENDOSCOPY;  Service: General;  Laterality: N/A;  pre: dyspepsia, hx of gastric sleeve   post: gastritis    • GASTRIC BYPASS N/A 2022    Procedure: GASTRIC BYPASS LAPAROSCOPIC conversion from sleeve,PARTIAL GASTRECTOMY;  Surgeon: Vinicio Arango Jr., MD;  Location: [facility] OR  [facility];  Service: Bariatric;  Laterality: N/A;   • GASTRIC SLEEVE LAPAROSCOPIC  04/14/2012       Social History     Socioeconomic History   • Marital status: Single   Tobacco Use   • Smoking status: Never   • Smokeless tobacco: Never   Vaping Use   • Vaping Use: Never used   Substance and Sexual Activity   • Alcohol use: Yes     Comment: rare/caffeine use   • Drug use: Never   • Sexual activity: Defer       Family History   Problem Relation Age of Onset   • Chronic fatigue Mother    • Cancer Mother    • Obesity Mother    • Hypertension Mother    • Hyperlipidemia Father    • Obesity Father    • Hypertension Father    • No Known Problems Sister    • Obesity Brother    • Sleep apnea Brother    • Sleep apnea Brother    • Obesity Brother    • No Known Problems Brother    • No Known Problems Son    • Obesity Maternal Grandmother    • Stroke Maternal Grandmother    • Cancer Maternal Grandfather    • Prostate cancer Maternal Grandfather    • No Known Problems Paternal Grandmother    • No Known Problems Paternal Grandfather    • Malig Hyperthermia Neg Hx          Current Outpatient Medications:   •  azithromycin (Zithromax) 500 MG tablet, Take 1 tablet by mouth Daily for 3 days., Disp: 3 tablet, Rfl: 0  •  FeroSul 325 (65 Fe) MG tablet, Take 325 mg by mouth Daily With Breakfast., Disp: , Rfl:   •  MELATONIN PO, Take 1 tablet by mouth Every Night., Disp: , Rfl:   •  multivitamin with minerals tablet tablet, Take 1 tablet by mouth Daily., Disp: , Rfl:   •  pantoprazole (Protonix) 40 MG EC tablet, Take 1 tablet by mouth Daily., Disp: 30 tablet, Rfl: 5  •  topiramate (TOPAMAX) 25 MG tablet, Take 1 tablet by mouth Every Night. HAS STOPPED FOR NOW (USED AS APPETITE SUPPRESSANT), Disp: 30 tablet, Rfl: 0  •  ursodiol (Actigall) 300 MG capsule, Take 1 capsule by mouth 2 (Two) Times a Day., Disp: 60 capsule, Rfl: 5      Review of Systems   Constitutional: Positive for appetite change. Negative for chills, diaphoresis, fatigue, fever and  unexpected weight loss.   Respiratory: Negative for cough.    Gastrointestinal: Positive for abdominal pain and diarrhea. Negative for abdominal distention, anal bleeding, bloating, blood in stool, constipation, flatus, nausea, rectal pain, vomiting, GERD and indigestion.   Musculoskeletal: Negative for arthralgias and myalgias.   Neurological: Negative for headache.        There were no vitals filed for this visit.    Objective   Physical Exam  Constitutional:       General: She is not in acute distress.     Appearance: Normal appearance. She is not ill-appearing, toxic-appearing or diaphoretic.   HENT:      Head: Normocephalic.      Mouth/Throat:      Lips: Pink.      Mouth: Mucous membranes are moist.   Pulmonary:      Effort: Pulmonary effort is normal.   Abdominal:      Tenderness: There is no abdominal tenderness (per pt).   Neurological:      Mental Status: She is alert and oriented to person, place, and time.          Procedures     Assessment & Plan   Diagnoses and all orders for this visit:    1. Traveler's diarrhea (Primary)  -     azithromycin (Zithromax) 500 MG tablet; Take 1 tablet by mouth Daily for 3 days.  Dispense: 3 tablet; Refill: 0      Try to slow down on Imodium and use sparingly if possible.  Increase fluid intake, eat a bland diet and rest.  If symptoms do not resolve with antibiotics, I recommend a stool culture.    If symptoms worsen or do not improve follow up with your PCP or visit your nearest Urgent Care Center or ER.      PLAN: Discussed dosing, side effects, recommended other symptomatic care.  Patient should follow up with primary care provider, Urgent Care or ER if symptoms worsen, fail to resolve or other symptoms need attention. Patient/family agree to the above.         TARIK Ovalle     The use of a video visit has been reviewed with the patient and verbal informed consent has been obtained. Myself and Sravanthi Kauffman participated in this visit. The patient is located  at [address]  [place] [id number]. I am located in [place], [place]. Mychart and Zoom were utilized.        This visit was performed via Telehealth.  This patient has been instructed to follow-up with their primary care provider if their symptoms worsen or the treatment provided does not resolve their illness.

## 2023-04-02 NOTE — PATIENT INSTRUCTIONS
Try to slow down on Imodium and use sparingly if possible.  Increase fluid intake, eat a bland diet and rest.  If symptoms do not resolve with antibiotics, I recommend a stool culture.    If symptoms worsen or do not improve follow up with your PCP or visit your nearest Urgent Care Center or ER.

## 2023-04-17 ENCOUNTER — OFFICE VISIT (OUTPATIENT)
Dept: BARIATRICS/WEIGHT MGMT | Facility: CLINIC | Age: 43
End: 2023-04-17
Payer: MEDICAID

## 2023-04-17 VITALS
HEIGHT: 63 IN | SYSTOLIC BLOOD PRESSURE: 132 MMHG | HEART RATE: 70 BPM | WEIGHT: 208 LBS | DIASTOLIC BLOOD PRESSURE: 80 MMHG | TEMPERATURE: 98.2 F | BODY MASS INDEX: 36.86 KG/M2

## 2023-04-17 DIAGNOSIS — Z71.3 DIETARY COUNSELING: ICD-10-CM

## 2023-04-17 DIAGNOSIS — D50.8 OTHER IRON DEFICIENCY ANEMIA: ICD-10-CM

## 2023-04-17 DIAGNOSIS — E66.9 OBESITY, CLASS II, BMI 35-39.9: Primary | ICD-10-CM

## 2023-04-17 DIAGNOSIS — Z98.84 HISTORY OF ROUX-EN-Y GASTRIC BYPASS: ICD-10-CM

## 2023-04-17 PROCEDURE — 1160F RVW MEDS BY RX/DR IN RCRD: CPT | Performed by: NURSE PRACTITIONER

## 2023-04-17 PROCEDURE — 99213 OFFICE O/P EST LOW 20 MIN: CPT | Performed by: NURSE PRACTITIONER

## 2023-04-17 PROCEDURE — 1159F MED LIST DOCD IN RCRD: CPT | Performed by: NURSE PRACTITIONER

## 2023-04-17 NOTE — PROGRESS NOTES
[facility]  [address] [facility] [address]  [facility] [address]  [phone]  [address] [facility] [address]  [facility] [address]  [phone]  Dept: [phone]  4/17/2023      Sravanthi Kauffman.  [id number]  [id number]  1980  female      Chief Complaint   Patient presents with   • Follow-up     7 mo fup RNY        Post-Op Bariatric Surgery:   Sravanthi Kauffman is status post Laparoscopic RNY Bypass Revision from Sleeve procedure, performed on 9/12/2022     HPI:   Today's weight is 94.3 kg (208 lb) pounds, today's BMI is Body mass index is 36.58 kg/m².,has a  loss of 8 pounds since the last visit andweight loss since surgery is 54 pounds. The patient reports a decreased portion size and loss of appetite.      Sravanthi Kauffman denies nausea, vomiting, reflux, dysphagia and reports tolerating regular diet.  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  She was exercising 5 days per week but now is not exercising at all as she sleeps during the day.  Her eating schedule has changed.  She does use premier protein shakes at times.  Last 2 months has been eating breads such as bagels occasionally.    B: skips or shake or boiled egg with turkey venegas and fruit  L: soup or 1/2 wrap  D: skip  S: protein packs/turkey and cheese  Drinks water and tea.  Serving size of chicken is around 4 oz.  Lots of days she skips 2 meals and has 1 larger meal.    Frustrated with weight as she got down under 200# and now has crept back up this month.     Diet and Exercise: Diet history reviewed and discussed with the patient. Weight loss/gains to date discussed with the patient. The patient states they are eating unknown grams of protein per day. She reports eating 2-3 meals per day, a typical portion size of 1/2 cup, eating 2 snacks per day, drinking 5+ or more 8-oz. glasses of water per day, no carbonated beverage consumption and exercising regularly.     Supplements:  bmtv.     Review of Systems   Constitutional: Positive for activity change and appetite change.   Respiratory: Negative for shortness of breath.    Cardiovascular: Negative for chest pain.   All other systems reviewed and are negative.      Patient Active Problem List   Diagnosis   • Constipation   • Shoulder pain   • Hip pain   • Back pain   • Carpal tunnel syndrome   • Anemia   • History of Divya-en-Y gastric bypass   • Numbness and tingling   • APC (atrial premature contractions)   • Dietary counseling   • Hypocalcemia   • Chronic fatigue   • Obesity, Class II, BMI 35-39.9       Past Medical History:   Diagnosis Date   • Anemia    • Anxiety    • Back pain    • Constipation    • History of heart murmur in childhood    • History of miscarriage    • Obesity    • Osteoarthritis of left knee    • Sleep difficulties    • SOB (shortness of breath) on exertion        The following portions of the patient's history were reviewed and updated as appropriate: allergies, current medications, past medical history, past surgical history and problem list.    Vitals:    04/17/23 0946   BP: 132/80   Pulse: 70   Temp: 98.2 °F (36.8 °C)       Physical Exam  Vitals reviewed.   Constitutional:       General: She is not in acute distress.     Appearance: Normal appearance. She is obese.   HENT:      Head: Normocephalic and atraumatic.      Mouth/Throat:      Mouth: Mucous membranes are moist.      Pharynx: Oropharynx is clear.   Eyes:      General: No scleral icterus.     Extraocular Movements: Extraocular movements intact.      Conjunctiva/sclera: Conjunctivae normal.      Pupils: Pupils are equal, round, and reactive to light.   Cardiovascular:      Rate and Rhythm: Normal rate and regular rhythm.      Heart sounds: Normal heart sounds. No murmur heard.    No gallop.   Pulmonary:      Effort: Pulmonary effort is normal. No respiratory distress.   Abdominal:      General: Bowel sounds are normal.      Palpations: Abdomen is soft.    Musculoskeletal:         General: Normal range of motion.      Cervical back: Normal range of motion and neck supple.   Skin:     General: Skin is warm and dry.   Neurological:      General: No focal deficit present.      Mental Status: She is alert and oriented to person, place, and time.   Psychiatric:         Mood and Affect: Mood normal.         Behavior: Behavior normal.         Thought Content: Thought content normal.         Judgment: Judgment normal.         Assessment:   Post-op, the patient is doing well.     Encounter Diagnoses   Name Primary?   • Obesity, Class II, BMI 35-39.9 Yes   • History of Divya-en-Y gastric bypass    • Dietary counseling        Plan:   Will get labs  Purchase b12 sublingual.  Has been taking pill form.  Discussed that believe that going back and forth between 1 meal vs 3 meals and lower protein is likely culprit in weight gain.  She needs to maintain regular pattern/schedule of eating and making sure to reach protein goals daily.  Also discussed role change in sleep pattern/working night shift likely has on her weight.  Limit high carb foods such as breads/bagels.    Follow up with dietician  Encouraged patient to be sure to get plenty of lean protein per day through small frequent meals all with a protein source.   Activity restrictions: none.   Recommended patient be sure to get at least 70 grams of protein per day by eating small, frequent meals all with high lean protein choices. Be sure to limit/cut back on daily carbohydrate intake. Discussed with the patient the recommended amount of water per day to intake- half of body weight in ounces. Reviewed vitamin requirements. Be sure to do routine exercise, 150 minutes per week minimum, including both cardio and strength training.     Instructions / Recommendations: dietary counseling recommended, recommended a daily protein intake of  grams, vitamin supplement(s) recommended, recommended exercising at least 150 minutes per  week, behavior modifications recommended and instructed to call the office for concerns, questions, or problems.     The patient was instructed to follow up in 3 months .     Total time spent during this encounter today was 25 minutes

## 2023-08-04 ENCOUNTER — TELEMEDICINE (OUTPATIENT)
Dept: FAMILY MEDICINE CLINIC | Facility: TELEHEALTH | Age: 43
End: 2023-08-04
Payer: MEDICAID

## 2023-08-04 DIAGNOSIS — L71.0 POD (PERIORAL DERMATITIS): Primary | ICD-10-CM

## 2023-08-04 RX ORDER — PREDNISONE 10 MG/1
TABLET ORAL
Qty: 18 TABLET | Refills: 0 | Status: SHIPPED | OUTPATIENT
Start: 2023-08-04

## 2023-08-04 RX ORDER — ERYTHROMYCIN AND BENZOYL PEROXIDE 30; 50 MG/G; MG/G
1 GEL TOPICAL 2 TIMES DAILY
Qty: 23.3 G | Refills: 1 | Status: SHIPPED | OUTPATIENT
Start: 2023-08-04

## 2023-08-21 ENCOUNTER — TELEMEDICINE (OUTPATIENT)
Dept: FAMILY MEDICINE CLINIC | Facility: TELEHEALTH | Age: 43
End: 2023-08-21
Payer: MEDICAID

## 2023-08-21 DIAGNOSIS — B34.9 VIRAL INFECTION: Primary | ICD-10-CM

## 2023-08-21 PROCEDURE — 99213 OFFICE O/P EST LOW 20 MIN: CPT | Performed by: NURSE PRACTITIONER

## 2023-08-21 RX ORDER — ONDANSETRON 8 MG/1
8 TABLET, ORALLY DISINTEGRATING ORAL EVERY 8 HOURS PRN
Qty: 15 TABLET | Refills: 0 | Status: SHIPPED | OUTPATIENT
Start: 2023-08-21

## 2023-08-21 RX ORDER — DEXTROMETHORPHAN HYDROBROMIDE AND PROMETHAZINE HYDROCHLORIDE 15; 6.25 MG/5ML; MG/5ML
5 SYRUP ORAL 4 TIMES DAILY PRN
Qty: 200 ML | Refills: 0 | Status: SHIPPED | OUTPATIENT
Start: 2023-08-21

## 2023-08-21 RX ORDER — COVID-19 ANTIGEN TEST
1 KIT MISCELLANEOUS ONCE
Qty: 1 KIT | Refills: 0 | Status: SHIPPED | OUTPATIENT
Start: 2023-08-21 | End: 2023-08-21

## 2023-08-21 NOTE — LETTER
August 21, 2023     Patient: Sravanthi Kauffman   YOB: 1980   Date of Visit: 8/21/2023       To Whom It May Concern:    It is my medical opinion that Sravanthi Kauffman may return to work on Thursday, August 24, 2023.            Sincerely,        TARIK Victoria    CC: No Recipients

## 2023-08-21 NOTE — PROGRESS NOTES
You have chosen to receive care through a telehealth visit.  Do you consent to use a video/audio connection for your medical care today? Yes     CHIEF COMPLAINT  No chief complaint on file.        HPI  Sravanthi Kauffman is a 43 y.o. female  presents with complaint of 3 day history of body aches, chills, fever 100-102.9, scratchy throat, cough, sweats, nausea.  Unsure if she has COVID or flu.  Feeling really bad.  Has had bariatric surgery and is unable to take ibuprofen but has been taking tylenol for fever.    Review of Systems  See HPI    Past Medical History:   Diagnosis Date    Anemia     Anxiety     Back pain     Constipation     History of heart murmur in childhood     History of miscarriage     Obesity     Osteoarthritis of left knee     Sleep difficulties     SOB (shortness of breath) on exertion        Family History   Problem Relation Age of Onset    Chronic fatigue Mother     Cancer Mother     Obesity Mother     Hypertension Mother     Hyperlipidemia Father     Obesity Father     Hypertension Father     No Known Problems Sister     Obesity Brother     Sleep apnea Brother     Sleep apnea Brother     Obesity Brother     No Known Problems Brother     No Known Problems Son     Obesity Maternal Grandmother     Stroke Maternal Grandmother     Cancer Maternal Grandfather     Prostate cancer Maternal Grandfather     No Known Problems Paternal Grandmother     No Known Problems Paternal Grandfather     Malig Hyperthermia Neg Hx        Social History     Socioeconomic History    Marital status: Single   Tobacco Use    Smoking status: Never    Smokeless tobacco: Never   Vaping Use    Vaping Use: Never used   Substance and Sexual Activity    Alcohol use: Yes     Comment: rare/caffeine use    Drug use: Never    Sexual activity: Defer       Sravanthi Kauffman  reports that she has never smoked. She has never used smokeless tobacco..              There were no vitals taken for this visit.    PHYSICAL EXAM  Physical Exam    Constitutional: She is oriented to person, place, and time. She appears well-developed and well-nourished. She does not have a sickly appearance. She appears ill.   HENT:   Head: Normocephalic and atraumatic.   Pulmonary/Chest: Effort normal.  No respiratory distress.  Neurological: She is alert and oriented to person, place, and time.       Diagnoses and all orders for this visit:    1. Viral infection (Primary)  -     COVID-19 At-Home Test kit; 1 each by In Vitro route 1 (One) Time for 1 dose.  Dispense: 1 kit; Refill: 0  -     promethazine-dextromethorphan (PROMETHAZINE-DM) 6.25-15 MG/5ML syrup; Take 5 mL by mouth 4 (Four) Times a Day As Needed for Cough.  Dispense: 200 mL; Refill: 0  -     ondansetron ODT (ZOFRAN-ODT) 8 MG disintegrating tablet; Place 1 tablet on the tongue Every 8 (Eight) Hours As Needed for Nausea or Vomiting.  Dispense: 15 tablet; Refill: 0    --take medications as prescribed  --increase fluids, rest as needed, tylenol for pain/fever  --recommend test for COVID-19 to rule out infection  --f/u in 3-5 days if no improvement        FOLLOW-UP  As discussed during visit with PCP/[facility] if no improvement or Urgent Care/Emergency Department if worsening of symptoms    Patient verbalizes understanding of medication dosage, comfort measures, instructions for treatment and follow-up.    Astrid Gorman, TARIK  08/21/2023  10:16 EDT    The use of a video visit has been reviewed with the patient and verbal informed consent has been obtained. Myself and Sravanthi Kauffman participated in this visit. The patient is located in [address] [place] [address].    I am located in [place], [place]. Sp[employer]ista and Kupon[employer] were utilized. I spent 8 minutes in the patient's chart for this visit.

## 2023-08-21 NOTE — PATIENT INSTRUCTIONS
Upper Respiratory Infection, Adult  An upper respiratory infection (URI) is a common viral infection of the nose, throat, and upper air passages that lead to the lungs. The most common type of URI is the common cold. URIs usually get better on their own, without medical treatment.  What are the causes?  A URI is caused by a virus. You may catch a virus by:  Breathing in droplets from an infected person's cough or sneeze.  Touching something that has been exposed to the virus (is contaminated) and then touching your mouth, nose, or eyes.  What increases the risk?  You are more likely to get a URI if:  You are very young or very old.  You have close contact with others, such as at work, school, or a health care facility.  You smoke.  You have long-term (chronic) heart or lung disease.  You have a weakened disease-fighting system (immune system).  You have nasal allergies or asthma.  You are experiencing a lot of stress.  You have poor nutrition.  What are the signs or symptoms?  A URI usually involves some of the following symptoms:  Runny or stuffy (congested) nose.  Cough.  Sneezing.  Sore throat.  Headache.  Fatigue.  Fever.  Loss of appetite.  Pain in your forehead, behind your eyes, and over your cheekbones (sinus pain).  Muscle aches.  Redness or irritation of the eyes.  Pressure in the ears or face.  How is this diagnosed?  This condition may be diagnosed based on your medical history and symptoms, and a physical exam. Your health care provider may use a swab to take a mucus sample from your nose (nasal swab). This sample can be tested to determine what virus is causing the illness.  How is this treated?  URIs usually get better on their own within 7-10 days. Medicines cannot cure URIs, but your health care provider may recommend certain medicines to help relieve symptoms, such as:  Over-the-counter cold medicines.  Cough suppressants. Coughing is a type of defense against infection that helps to clear the  respiratory system, so take these medicines only as recommended by your health care provider.  Fever-reducing medicines.  Follow these instructions at home:  Activity  Rest as needed.  If you have a fever, stay home from work or school until your fever is gone or until your health care provider says your URI cannot spread to other people (is no longer contagious). Your health care provider may have you wear a face mask to prevent your infection from spreading.  Relieving symptoms  Gargle with a mixture of salt and water 3-4 times a day or as needed. To make salt water, completely dissolve «-1 tsp (3-6 g) of salt in 1 cup (237 mL) of warm water.  Use a cool-mist humidifier to add moisture to the air. This can help you breathe more easily.  Eating and drinking    Drink enough fluid to keep your urine pale yellow.  Eat soups and other clear broths.  General instructions    Take over-the-counter and prescription medicines only as told by your health care provider. These include cold medicines, fever reducers, and cough suppressants.  Do not use any products that contain nicotine or tobacco. These products include cigarettes, chewing tobacco, and vaping devices, such as e-cigarettes. If you need help quitting, ask your health care provider.  Stay away from secondhand smoke.  Stay up to date on all immunizations, including the yearly (annual) flu vaccine.  Keep all follow-up visits. This is important.  How to prevent the spread of infection to others  URIs can be contagious. To prevent the infection from spreading:  Wash your hands with soap and water for at least 20 seconds. If soap and water are not available, use hand .  Avoid touching your mouth, face, eyes, or nose.  Cough or sneeze into a tissue or your sleeve or elbow instead of into your hand or into the air.    Contact a health care provider if:  You are getting worse instead of better.  You have a fever or chills.  Your mucus is brown or red.  You have  yellow or brown discharge coming from your nose.  You have pain in your face, especially when you bend forward.  You have swollen neck glands.  You have pain while swallowing.  You have white areas in the back of your throat.  Get help right away if:  You have shortness of breath that gets worse.  You have severe or persistent:  Headache.  Ear pain.  Sinus pain.  Chest pain.  You have chronic lung disease along with any of the following:  Making high-pitched whistling sounds when you breathe, most often when you breathe out (wheezing).  Prolonged cough (more than 14 days).  Coughing up blood.  A change in your usual mucus.  You have a stiff neck.  You have changes in your:  Vision.  Hearing.  Thinking.  Mood.  These symptoms may be an emergency. Get help right away. Call 911.  Do not wait to see if the symptoms will go away.  Do not drive yourself to the hospital.  Summary  An upper respiratory infection (URI) is a common infection of the nose, throat, and upper air passages that lead to the lungs.  A URI is caused by a virus.  URIs usually get better on their own within 7-10 days.  Medicines cannot cure URIs, but your health care provider may recommend certain medicines to help relieve symptoms.  This information is not intended to replace advice given to you by your health care provider. Make sure you discuss any questions you have with your health care provider.  Document Revised: 07/20/2022 Document Reviewed: 07/20/2022  [employer] Patient Education c 2023 [employer]

## 2023-09-25 ENCOUNTER — LAB (OUTPATIENT)
Dept: LAB | Facility: HOSPITAL | Age: 43
End: 2023-09-25
Payer: MEDICAID

## 2023-09-25 DIAGNOSIS — E83.51 HYPOCALCEMIA: ICD-10-CM

## 2023-09-25 DIAGNOSIS — R20.2 NUMBNESS AND TINGLING: ICD-10-CM

## 2023-09-25 DIAGNOSIS — G89.29 CHRONIC BACK PAIN, UNSPECIFIED BACK LOCATION, UNSPECIFIED BACK PAIN LATERALITY: ICD-10-CM

## 2023-09-25 DIAGNOSIS — Z71.3 DIETARY COUNSELING: ICD-10-CM

## 2023-09-25 DIAGNOSIS — Z98.84 HISTORY OF ROUX-EN-Y GASTRIC BYPASS: ICD-10-CM

## 2023-09-25 DIAGNOSIS — R20.0 NUMBNESS AND TINGLING: ICD-10-CM

## 2023-09-25 DIAGNOSIS — D50.8 OTHER IRON DEFICIENCY ANEMIA: ICD-10-CM

## 2023-09-25 DIAGNOSIS — E66.9 OBESITY, CLASS II, BMI 35-39.9: ICD-10-CM

## 2023-09-25 DIAGNOSIS — R53.82 CHRONIC FATIGUE: ICD-10-CM

## 2023-09-25 DIAGNOSIS — M54.9 CHRONIC BACK PAIN, UNSPECIFIED BACK LOCATION, UNSPECIFIED BACK PAIN LATERALITY: ICD-10-CM

## 2023-09-25 LAB
ALBUMIN SERPL-MCNC: 4 G/DL (ref 3.5–5.2)
ALBUMIN/GLOB SERPL: 1.2 G/DL
ALP SERPL-CCNC: 80 U/L (ref 39–117)
ALT SERPL W P-5'-P-CCNC: 12 U/L (ref 1–33)
ANION GAP SERPL CALCULATED.3IONS-SCNC: 8.6 MMOL/L (ref 5–15)
AST SERPL-CCNC: 17 U/L (ref 1–32)
BASOPHILS # BLD AUTO: 0.02 10*3/MM3 (ref 0–0.2)
BASOPHILS NFR BLD AUTO: 0.5 % (ref 0–1.5)
BILIRUB SERPL-MCNC: <0.2 MG/DL (ref 0–1.2)
BUN SERPL-MCNC: 13 MG/DL (ref 6–20)
BUN/CREAT SERPL: 17.8 (ref 7–25)
CALCIUM SPEC-SCNC: 9.1 MG/DL (ref 8.6–10.5)
CHLORIDE SERPL-SCNC: 106 MMOL/L (ref 98–107)
CO2 SERPL-SCNC: 25.4 MMOL/L (ref 22–29)
CREAT SERPL-MCNC: 0.73 MG/DL (ref 0.57–1)
DEPRECATED RDW RBC AUTO: 45.3 FL (ref 37–54)
EGFRCR SERPLBLD CKD-EPI 2021: 104.1 ML/MIN/1.73
EOSINOPHIL # BLD AUTO: 0.1 10*3/MM3 (ref 0–0.4)
EOSINOPHIL NFR BLD AUTO: 2.3 % (ref 0.3–6.2)
ERYTHROCYTE [DISTWIDTH] IN BLOOD BY AUTOMATED COUNT: 14.4 % (ref 12.3–15.4)
FERRITIN SERPL-MCNC: 13 NG/ML (ref 13–150)
FOLATE SERPL-MCNC: >20 NG/ML (ref 4.78–24.2)
GLOBULIN UR ELPH-MCNC: 3.3 GM/DL
GLUCOSE SERPL-MCNC: 99 MG/DL (ref 65–99)
HCT VFR BLD AUTO: 30 % (ref 34–46.6)
HGB BLD-MCNC: 9.4 G/DL (ref 12–15.9)
IMM GRANULOCYTES # BLD AUTO: 0.01 10*3/MM3 (ref 0–0.05)
IMM GRANULOCYTES NFR BLD AUTO: 0.2 % (ref 0–0.5)
IRON 24H UR-MRATE: 32 MCG/DL (ref 37–145)
LYMPHOCYTES # BLD AUTO: 2.07 10*3/MM3 (ref 0.7–3.1)
LYMPHOCYTES NFR BLD AUTO: 47.7 % (ref 19.6–45.3)
MAGNESIUM SERPL-MCNC: 2 MG/DL (ref 1.6–2.6)
MCH RBC QN AUTO: 27 PG (ref 26.6–33)
MCHC RBC AUTO-ENTMCNC: 31.3 G/DL (ref 31.5–35.7)
MCV RBC AUTO: 86.2 FL (ref 79–97)
MONOCYTES # BLD AUTO: 0.37 10*3/MM3 (ref 0.1–0.9)
MONOCYTES NFR BLD AUTO: 8.5 % (ref 5–12)
NEUTROPHILS NFR BLD AUTO: 1.77 10*3/MM3 (ref 1.7–7)
NEUTROPHILS NFR BLD AUTO: 40.8 % (ref 42.7–76)
NRBC BLD AUTO-RTO: 0 /100 WBC (ref 0–0.2)
PHOSPHATE SERPL-MCNC: 3.6 MG/DL (ref 2.5–4.5)
PLATELET # BLD AUTO: 207 10*3/MM3 (ref 140–450)
PMV BLD AUTO: 10.9 FL (ref 6–12)
POTASSIUM SERPL-SCNC: 3.6 MMOL/L (ref 3.5–5.2)
PREALB SERPL-MCNC: 15.8 MG/DL (ref 20–40)
PROT SERPL-MCNC: 7.3 G/DL (ref 6–8.5)
RBC # BLD AUTO: 3.48 10*6/MM3 (ref 3.77–5.28)
SODIUM SERPL-SCNC: 140 MMOL/L (ref 136–145)
WBC NRBC COR # BLD: 4.34 10*3/MM3 (ref 3.4–10.8)

## 2023-09-25 PROCEDURE — 83735 ASSAY OF MAGNESIUM: CPT

## 2023-09-25 PROCEDURE — 85025 COMPLETE CBC W/AUTO DIFF WBC: CPT

## 2023-09-25 PROCEDURE — 84100 ASSAY OF PHOSPHORUS: CPT

## 2023-09-25 PROCEDURE — 83540 ASSAY OF IRON: CPT

## 2023-09-25 PROCEDURE — 84425 ASSAY OF VITAMIN B-1: CPT

## 2023-09-25 PROCEDURE — 82728 ASSAY OF FERRITIN: CPT

## 2023-09-25 PROCEDURE — 84590 ASSAY OF VITAMIN A: CPT

## 2023-09-25 PROCEDURE — 82746 ASSAY OF FOLIC ACID SERUM: CPT

## 2023-09-25 PROCEDURE — 83921 ORGANIC ACID SINGLE QUANT: CPT

## 2023-09-25 PROCEDURE — 84134 ASSAY OF PREALBUMIN: CPT

## 2023-09-25 PROCEDURE — 36415 COLL VENOUS BLD VENIPUNCTURE: CPT

## 2023-09-25 PROCEDURE — 80053 COMPREHEN METABOLIC PANEL: CPT

## 2023-09-26 ENCOUNTER — OFFICE VISIT (OUTPATIENT)
Dept: BARIATRICS/WEIGHT MGMT | Facility: CLINIC | Age: 43
End: 2023-09-26
Payer: MEDICAID

## 2023-09-26 VITALS
WEIGHT: 190 LBS | HEART RATE: 76 BPM | BODY MASS INDEX: 33.66 KG/M2 | HEIGHT: 63 IN | SYSTOLIC BLOOD PRESSURE: 126 MMHG | DIASTOLIC BLOOD PRESSURE: 91 MMHG | TEMPERATURE: 98.3 F

## 2023-09-26 DIAGNOSIS — Z71.3 DIETARY COUNSELING: ICD-10-CM

## 2023-09-26 DIAGNOSIS — D50.8 IRON DEFICIENCY ANEMIA FOLLOWING BARIATRIC SURGERY: ICD-10-CM

## 2023-09-26 DIAGNOSIS — E66.9 OBESITY, CLASS I, BMI 30-34.9: Primary | ICD-10-CM

## 2023-09-26 DIAGNOSIS — Z98.84 HISTORY OF ROUX-EN-Y GASTRIC BYPASS: Primary | ICD-10-CM

## 2023-09-26 DIAGNOSIS — Z98.84 HISTORY OF ROUX-EN-Y GASTRIC BYPASS: ICD-10-CM

## 2023-09-26 DIAGNOSIS — K95.89 IRON DEFICIENCY ANEMIA FOLLOWING BARIATRIC SURGERY: ICD-10-CM

## 2023-09-26 PROBLEM — E66.811 OBESITY, CLASS I, BMI 30-34.9: Status: ACTIVE | Noted: 2023-01-17

## 2023-09-26 PROCEDURE — 1160F RVW MEDS BY RX/DR IN RCRD: CPT | Performed by: NURSE PRACTITIONER

## 2023-09-26 PROCEDURE — 99213 OFFICE O/P EST LOW 20 MIN: CPT | Performed by: NURSE PRACTITIONER

## 2023-09-26 PROCEDURE — 1159F MED LIST DOCD IN RCRD: CPT | Performed by: NURSE PRACTITIONER

## 2023-09-26 RX ORDER — NYSTATIN 100000 [USP'U]/G
POWDER TOPICAL 3 TIMES DAILY
Qty: 60 G | Refills: 3 | Status: SHIPPED | OUTPATIENT
Start: 2023-09-26

## 2023-09-26 RX ORDER — FAMOTIDINE 10 MG/ML
20 INJECTION, SOLUTION INTRAVENOUS AS NEEDED
OUTPATIENT
Start: 2023-09-26

## 2023-09-26 RX ORDER — TOPIRAMATE 50 MG/1
50 TABLET, FILM COATED ORAL DAILY
Qty: 30 TABLET | Refills: 3 | Status: SHIPPED | OUTPATIENT
Start: 2023-09-26

## 2023-09-26 RX ORDER — DIPHENHYDRAMINE HYDROCHLORIDE 50 MG/ML
50 INJECTION INTRAMUSCULAR; INTRAVENOUS AS NEEDED
OUTPATIENT
Start: 2023-09-26

## 2023-09-26 RX ORDER — SODIUM CHLORIDE 9 MG/ML
250 INJECTION, SOLUTION INTRAVENOUS ONCE
OUTPATIENT
Start: 2023-09-26

## 2023-09-26 NOTE — PROGRESS NOTES
[facility] BARIATRIC [facility] BARIATRIC SURGERY  [address] [facility] [address]  [facility] [address]  [phone]  [address] [facility] [address]  [facility] [id number] [phone]  Dept: [phone]  9/26/2023      Sravanthi Kauffman.  [id number]  [id number]  1980  female      Chief Complaint   Patient presents with    Follow-up     1 year follow up       BH Post-Op Bariatric Surgery:   Sravanthi Kauffman is status post Laparoscopic RNY Bypass Revision from Sleeve procedure, performed on 9/12/2022     HPI:   Today's weight is 86.2 kg (190 lb) pounds, today's BMI is Body mass index is 33.41 kg/m²., has a  loss of 18 pounds since the last visit and weight loss since surgery is 72 pounds. The patient reports a decreased portion size and loss of appetite.      Sravanthi Kauffman denies nausea, vomiting, reflux, dysphagia and reports fatigue.  Had labs drawn yesterday.  Has been more fatigued recently.  Takes bmtv and occasionally some additional iron when she can tolerate it.  Struggles tolerating the FeroSul.    Feels like cravings for sweets has returned somewhat.  Thinks Topamax helps some but has gotten used to it somewhat.  Would like to increase dose.       Diet and Exercise: Diet history reviewed and discussed with the patient. Weight loss/gains to date discussed with the patient. The patient states they are eating 40 grams of protein per day. She reports eating 2 meals per day, a typical portion size of 1 cup, eating 3 snacks per day, drinking 5 or more 8-oz. glasses of water per day, no carbonated beverage consumption and exercising regularly.     Supplements: bmtv.     Review of Systems   Constitutional:  Positive for activity change and appetite change.   Respiratory:  Negative for shortness of breath.    Cardiovascular:  Negative for chest pain.   All other systems reviewed and are negative.    Patient Active Problem List   Diagnosis    Constipation    Shoulder pain    Hip pain    Back pain     Carpal tunnel syndrome    Anemia    History of Divya-en-Y gastric bypass    Numbness and tingling    APC (atrial premature contractions)    Dietary counseling    Hypocalcemia    Chronic fatigue    Obesity, Class I, BMI 30-34.9       Past Medical History:   Diagnosis Date    Anemia     Anxiety     Back pain     Constipation     History of heart murmur in childhood     History of miscarriage     Obesity     Osteoarthritis of left knee     Sleep difficulties     SOB (shortness of breath) on exertion        The following portions of the patient's history were reviewed and updated as appropriate: allergies, current medications, past medical history, past surgical history, and problem list.    Vitals:    09/26/23 0852   BP: 126/91   Pulse: 76   Temp: 98.3 °F (36.8 °C)       Physical Exam  Vitals reviewed.   Constitutional:       General: She is not in acute distress.     Appearance: Normal appearance. She is obese.   HENT:      Head: Normocephalic and atraumatic.      Mouth/Throat:      Mouth: Mucous membranes are moist.      Pharynx: Oropharynx is clear.   Eyes:      General: No scleral icterus.     Extraocular Movements: Extraocular movements intact.      Conjunctiva/sclera: Conjunctivae normal.      Pupils: Pupils are equal, round, and reactive to light.   Cardiovascular:      Rate and Rhythm: Normal rate and regular rhythm.   Pulmonary:      Effort: Pulmonary effort is normal. No respiratory distress.   Abdominal:      General: Bowel sounds are normal.      Palpations: Abdomen is soft.   Musculoskeletal:         General: Normal range of motion.      Cervical back: Normal range of motion and neck supple.   Skin:     General: Skin is warm and dry.   Neurological:      General: No focal deficit present.      Mental Status: She is alert and oriented to person, place, and time.   Psychiatric:         Mood and Affect: Mood normal.         Behavior: Behavior normal.         Thought Content: Thought content normal.          Judgment: Judgment normal.       Assessment:   Post-op, the patient is doing well.     Encounter Diagnoses   Name Primary?    Obesity, Class I, BMI 30-34.9 Yes    History of Divya-en-Y gastric bypass     Dietary counseling        Plan:   Reviewed labs in office.  Will attempt iron infusions  Discussed increasing protein intake.  Will send in topamax 50 mg daily   Encouraged patient to be sure to get plenty of lean protein per day through small frequent meals all with a protein source.   Activity restrictions: none.   Recommended patient be sure to get at least 70 grams of protein per day by eating small, frequent meals all with high lean protein choices. Be sure to limit/cut back on daily carbohydrate intake. Discussed with the patient the recommended amount of water per day to intake- half of body weight in ounces. Reviewed vitamin requirements. Be sure to do routine exercise, 150 minutes per week minimum, including both cardio and strength training.     Instructions / Recommendations: dietary counseling recommended, recommended a daily protein intake of  grams, vitamin supplement(s) recommended, recommended exercising at least 150 minutes per week, behavior modifications recommended and instructed to call the office for concerns, questions, or problems.     The patient was instructed to follow up in 6 months.     Total time spent during this encounter today was 25 minutes

## 2023-09-28 LAB
METHYLMALONATE SERPL-SCNC: 120 NMOL/L (ref 0–378)
VIT B1 BLD-SCNC: 68.9 NMOL/L (ref 66.5–200)

## 2023-09-30 LAB — VIT A SERPL-MCNC: 22.3 UG/DL (ref 20.1–62)

## 2023-10-11 ENCOUNTER — HOSPITAL ENCOUNTER (OUTPATIENT)
Dept: INFUSION THERAPY | Facility: HOSPITAL | Age: 43
Discharge: HOME | End: 2023-10-11
Admitting: NURSE PRACTITIONER
Payer: MEDICAID

## 2023-10-11 VITALS
DIASTOLIC BLOOD PRESSURE: 82 MMHG | OXYGEN SATURATION: 100 % | HEART RATE: 69 BPM | RESPIRATION RATE: 20 BRPM | TEMPERATURE: 96.9 F | SYSTOLIC BLOOD PRESSURE: 112 MMHG

## 2023-10-11 DIAGNOSIS — K95.89 IRON DEFICIENCY ANEMIA FOLLOWING BARIATRIC SURGERY: ICD-10-CM

## 2023-10-11 DIAGNOSIS — D50.8 IRON DEFICIENCY ANEMIA FOLLOWING BARIATRIC SURGERY: ICD-10-CM

## 2023-10-11 DIAGNOSIS — Z98.84 HISTORY OF ROUX-EN-Y GASTRIC BYPASS: Primary | ICD-10-CM

## 2023-10-11 PROCEDURE — 96374 THER/PROPH/DIAG INJ IV PUSH: CPT

## 2023-10-11 PROCEDURE — 96365 THER/PROPH/DIAG IV INF INIT: CPT

## 2023-10-11 PROCEDURE — 25010000002 IRON SUCROSE PER 1 MG: Performed by: NURSE PRACTITIONER

## 2023-10-11 PROCEDURE — 96375 TX/PRO/DX INJ NEW DRUG ADDON: CPT

## 2023-10-11 RX ORDER — SODIUM CHLORIDE 9 MG/ML
250 INJECTION, SOLUTION INTRAVENOUS ONCE
Status: CANCELLED | OUTPATIENT
Start: 2023-10-11

## 2023-10-11 RX ORDER — SODIUM CHLORIDE 9 MG/ML
250 INJECTION, SOLUTION INTRAVENOUS ONCE
Status: DISCONTINUED | OUTPATIENT
Start: 2023-10-11 | End: 2023-10-13 | Stop reason: HOSPADM

## 2023-10-11 RX ORDER — ACETAMINOPHEN 325 MG/1
650 TABLET ORAL ONCE
Status: COMPLETED | OUTPATIENT
Start: 2023-10-11 | End: 2023-10-11

## 2023-10-11 RX ORDER — FAMOTIDINE 10 MG/ML
20 INJECTION, SOLUTION INTRAVENOUS ONCE
Status: COMPLETED | OUTPATIENT
Start: 2023-10-11 | End: 2023-10-11

## 2023-10-11 RX ORDER — FAMOTIDINE 10 MG/ML
20 INJECTION, SOLUTION INTRAVENOUS ONCE
Status: CANCELLED | OUTPATIENT
Start: 2023-10-11

## 2023-10-11 RX ORDER — ACETAMINOPHEN 325 MG/1
650 TABLET ORAL ONCE
Status: CANCELLED | OUTPATIENT
Start: 2023-10-11

## 2023-10-11 RX ADMIN — IRON SUCROSE 200 MG: 20 INJECTION, SOLUTION INTRAVENOUS at 10:44

## 2023-10-11 RX ADMIN — ACETAMINOPHEN 650 MG: 325 TABLET, FILM COATED ORAL at 10:26

## 2023-10-11 RX ADMIN — FAMOTIDINE 20 MG: 10 INJECTION INTRAVENOUS at 10:26

## 2023-10-13 ENCOUNTER — HOSPITAL ENCOUNTER (OUTPATIENT)
Dept: INFUSION THERAPY | Facility: HOSPITAL | Age: 43
Discharge: HOME | End: 2023-10-13
Payer: MEDICAID

## 2023-10-13 VITALS
OXYGEN SATURATION: 100 % | DIASTOLIC BLOOD PRESSURE: 76 MMHG | HEART RATE: 62 BPM | TEMPERATURE: 97.3 F | RESPIRATION RATE: 18 BRPM | SYSTOLIC BLOOD PRESSURE: 112 MMHG

## 2023-10-13 DIAGNOSIS — Z98.84 HISTORY OF ROUX-EN-Y GASTRIC BYPASS: Primary | ICD-10-CM

## 2023-10-13 DIAGNOSIS — D50.8 IRON DEFICIENCY ANEMIA FOLLOWING BARIATRIC SURGERY: ICD-10-CM

## 2023-10-13 DIAGNOSIS — K95.89 IRON DEFICIENCY ANEMIA FOLLOWING BARIATRIC SURGERY: ICD-10-CM

## 2023-10-13 PROCEDURE — 96374 THER/PROPH/DIAG INJ IV PUSH: CPT

## 2023-10-13 PROCEDURE — 96375 TX/PRO/DX INJ NEW DRUG ADDON: CPT

## 2023-10-13 PROCEDURE — 25010000002 IRON SUCROSE PER 1 MG: Performed by: NURSE PRACTITIONER

## 2023-10-13 PROCEDURE — 96365 THER/PROPH/DIAG IV INF INIT: CPT

## 2023-10-13 RX ORDER — SODIUM CHLORIDE 9 MG/ML
250 INJECTION, SOLUTION INTRAVENOUS ONCE
Status: CANCELLED | OUTPATIENT
Start: 2023-10-13

## 2023-10-13 RX ORDER — SODIUM CHLORIDE 9 MG/ML
250 INJECTION, SOLUTION INTRAVENOUS ONCE
Status: DISCONTINUED | OUTPATIENT
Start: 2023-10-13 | End: 2023-10-15 | Stop reason: HOSPADM

## 2023-10-13 RX ORDER — FAMOTIDINE 10 MG/ML
20 INJECTION, SOLUTION INTRAVENOUS ONCE
Status: COMPLETED | OUTPATIENT
Start: 2023-10-13 | End: 2023-10-13

## 2023-10-13 RX ORDER — FAMOTIDINE 10 MG/ML
20 INJECTION, SOLUTION INTRAVENOUS ONCE
Status: CANCELLED | OUTPATIENT
Start: 2023-10-13

## 2023-10-13 RX ORDER — ACETAMINOPHEN 325 MG/1
650 TABLET ORAL ONCE
Status: CANCELLED | OUTPATIENT
Start: 2023-10-13

## 2023-10-13 RX ORDER — ACETAMINOPHEN 325 MG/1
650 TABLET ORAL ONCE
Status: COMPLETED | OUTPATIENT
Start: 2023-10-13 | End: 2023-10-13

## 2023-10-13 RX ADMIN — FAMOTIDINE 20 MG: 10 INJECTION INTRAVENOUS at 09:48

## 2023-10-13 RX ADMIN — IRON SUCROSE 200 MG: 20 INJECTION, SOLUTION INTRAVENOUS at 09:55

## 2023-10-13 RX ADMIN — ACETAMINOPHEN 650 MG: 325 TABLET, FILM COATED ORAL at 09:22

## 2023-10-16 ENCOUNTER — HOSPITAL ENCOUNTER (OUTPATIENT)
Dept: INFUSION THERAPY | Facility: HOSPITAL | Age: 43
Discharge: HOME | End: 2023-10-16
Admitting: NURSE PRACTITIONER
Payer: MEDICAID

## 2023-10-16 VITALS
TEMPERATURE: 98 F | HEART RATE: 59 BPM | SYSTOLIC BLOOD PRESSURE: 108 MMHG | DIASTOLIC BLOOD PRESSURE: 80 MMHG | RESPIRATION RATE: 16 BRPM | OXYGEN SATURATION: 100 %

## 2023-10-16 DIAGNOSIS — K95.89 IRON DEFICIENCY ANEMIA FOLLOWING BARIATRIC SURGERY: ICD-10-CM

## 2023-10-16 DIAGNOSIS — Z98.84 HISTORY OF ROUX-EN-Y GASTRIC BYPASS: Primary | ICD-10-CM

## 2023-10-16 DIAGNOSIS — D50.8 IRON DEFICIENCY ANEMIA FOLLOWING BARIATRIC SURGERY: ICD-10-CM

## 2023-10-16 PROCEDURE — 96374 THER/PROPH/DIAG INJ IV PUSH: CPT

## 2023-10-16 PROCEDURE — 25010000002 IRON SUCROSE PER 1 MG: Performed by: NURSE PRACTITIONER

## 2023-10-16 PROCEDURE — 96365 THER/PROPH/DIAG IV INF INIT: CPT

## 2023-10-16 RX ORDER — ACETAMINOPHEN 325 MG/1
650 TABLET ORAL ONCE
Status: CANCELLED | OUTPATIENT
Start: 2023-10-16

## 2023-10-16 RX ORDER — ACETAMINOPHEN 325 MG/1
650 TABLET ORAL ONCE
Status: COMPLETED | OUTPATIENT
Start: 2023-10-16 | End: 2023-10-16

## 2023-10-16 RX ORDER — SODIUM CHLORIDE 9 MG/ML
250 INJECTION, SOLUTION INTRAVENOUS ONCE
Status: DISCONTINUED | OUTPATIENT
Start: 2023-10-16 | End: 2023-10-18 | Stop reason: HOSPADM

## 2023-10-16 RX ORDER — FAMOTIDINE 10 MG/ML
20 INJECTION, SOLUTION INTRAVENOUS ONCE
Status: CANCELLED | OUTPATIENT
Start: 2023-10-16

## 2023-10-16 RX ORDER — SODIUM CHLORIDE 9 MG/ML
250 INJECTION, SOLUTION INTRAVENOUS ONCE
Status: CANCELLED | OUTPATIENT
Start: 2023-10-16

## 2023-10-16 RX ORDER — FAMOTIDINE 10 MG/ML
20 INJECTION, SOLUTION INTRAVENOUS ONCE
Status: COMPLETED | OUTPATIENT
Start: 2023-10-16 | End: 2023-10-16

## 2023-10-16 RX ADMIN — FAMOTIDINE 20 MG: 10 INJECTION, SOLUTION INTRAVENOUS at 11:46

## 2023-10-16 RX ADMIN — ACETAMINOPHEN 650 MG: 325 TABLET, FILM COATED ORAL at 11:40

## 2023-10-16 RX ADMIN — IRON SUCROSE 200 MG: 20 INJECTION, SOLUTION INTRAVENOUS at 12:17

## 2023-10-18 ENCOUNTER — HOSPITAL ENCOUNTER (OUTPATIENT)
Dept: INFUSION THERAPY | Facility: HOSPITAL | Age: 43
Discharge: HOME | End: 2023-10-18
Admitting: NURSE PRACTITIONER
Payer: MEDICAID

## 2023-10-18 VITALS
OXYGEN SATURATION: 100 % | TEMPERATURE: 96.9 F | RESPIRATION RATE: 18 BRPM | SYSTOLIC BLOOD PRESSURE: 114 MMHG | DIASTOLIC BLOOD PRESSURE: 83 MMHG | HEART RATE: 62 BPM

## 2023-10-18 DIAGNOSIS — Z98.84 HISTORY OF ROUX-EN-Y GASTRIC BYPASS: Primary | ICD-10-CM

## 2023-10-18 DIAGNOSIS — D50.8 IRON DEFICIENCY ANEMIA FOLLOWING BARIATRIC SURGERY: ICD-10-CM

## 2023-10-18 DIAGNOSIS — K95.89 IRON DEFICIENCY ANEMIA FOLLOWING BARIATRIC SURGERY: ICD-10-CM

## 2023-10-18 PROCEDURE — 96374 THER/PROPH/DIAG INJ IV PUSH: CPT

## 2023-10-18 PROCEDURE — 25010000002 IRON SUCROSE PER 1 MG: Performed by: NURSE PRACTITIONER

## 2023-10-18 PROCEDURE — 96365 THER/PROPH/DIAG IV INF INIT: CPT

## 2023-10-18 PROCEDURE — 96375 TX/PRO/DX INJ NEW DRUG ADDON: CPT

## 2023-10-18 RX ORDER — ACETAMINOPHEN 325 MG/1
650 TABLET ORAL ONCE
Status: CANCELLED | OUTPATIENT
Start: 2023-10-18

## 2023-10-18 RX ORDER — FAMOTIDINE 10 MG/ML
20 INJECTION, SOLUTION INTRAVENOUS ONCE
Status: COMPLETED | OUTPATIENT
Start: 2023-10-18 | End: 2023-10-18

## 2023-10-18 RX ORDER — FAMOTIDINE 10 MG/ML
20 INJECTION, SOLUTION INTRAVENOUS ONCE
Status: CANCELLED | OUTPATIENT
Start: 2023-10-18

## 2023-10-18 RX ORDER — ACETAMINOPHEN 325 MG/1
650 TABLET ORAL ONCE
Status: COMPLETED | OUTPATIENT
Start: 2023-10-18 | End: 2023-10-18

## 2023-10-18 RX ORDER — SODIUM CHLORIDE 9 MG/ML
250 INJECTION, SOLUTION INTRAVENOUS ONCE
Status: CANCELLED | OUTPATIENT
Start: 2023-10-18

## 2023-10-18 RX ADMIN — ACETAMINOPHEN 650 MG: 325 TABLET, FILM COATED ORAL at 11:32

## 2023-10-18 RX ADMIN — FAMOTIDINE 20 MG: 10 INJECTION, SOLUTION INTRAVENOUS at 11:39

## 2023-10-18 RX ADMIN — IRON SUCROSE 200 MG: 20 INJECTION, SOLUTION INTRAVENOUS at 12:10

## 2023-10-20 ENCOUNTER — HOSPITAL ENCOUNTER (OUTPATIENT)
Dept: INFUSION THERAPY | Facility: HOSPITAL | Age: 43
Discharge: HOME | End: 2023-10-20
Payer: MEDICAID

## 2023-10-20 ENCOUNTER — TELEPHONE (OUTPATIENT)
Dept: BARIATRICS/WEIGHT MGMT | Facility: CLINIC | Age: 43
End: 2023-10-20
Payer: MEDICAID

## 2023-10-20 VITALS
TEMPERATURE: 98 F | HEART RATE: 70 BPM | RESPIRATION RATE: 16 BRPM | SYSTOLIC BLOOD PRESSURE: 117 MMHG | OXYGEN SATURATION: 100 % | DIASTOLIC BLOOD PRESSURE: 83 MMHG

## 2023-10-20 DIAGNOSIS — D50.8 IRON DEFICIENCY ANEMIA FOLLOWING BARIATRIC SURGERY: ICD-10-CM

## 2023-10-20 DIAGNOSIS — Z98.84 HISTORY OF ROUX-EN-Y GASTRIC BYPASS: Primary | ICD-10-CM

## 2023-10-20 DIAGNOSIS — K95.89 IRON DEFICIENCY ANEMIA FOLLOWING BARIATRIC SURGERY: ICD-10-CM

## 2023-10-20 PROCEDURE — G0463 HOSPITAL OUTPT CLINIC VISIT: HCPCS

## 2023-10-20 RX ORDER — FAMOTIDINE 10 MG/ML
20 INJECTION, SOLUTION INTRAVENOUS ONCE
Status: DISCONTINUED | OUTPATIENT
Start: 2023-10-20 | End: 2023-10-22 | Stop reason: HOSPADM

## 2023-10-20 RX ORDER — SODIUM CHLORIDE 9 MG/ML
250 INJECTION, SOLUTION INTRAVENOUS ONCE
Status: DISCONTINUED | OUTPATIENT
Start: 2023-10-20 | End: 2023-10-22 | Stop reason: HOSPADM

## 2023-10-20 RX ORDER — ACETAMINOPHEN 325 MG/1
650 TABLET ORAL ONCE
Status: DISCONTINUED | OUTPATIENT
Start: 2023-10-20 | End: 2023-10-22 | Stop reason: HOSPADM

## 2023-10-20 RX ORDER — FAMOTIDINE 10 MG/ML
20 INJECTION, SOLUTION INTRAVENOUS ONCE
Status: CANCELLED | OUTPATIENT
Start: 2023-10-20

## 2023-10-20 RX ORDER — SODIUM CHLORIDE 9 MG/ML
250 INJECTION, SOLUTION INTRAVENOUS ONCE
OUTPATIENT
Start: 2023-10-20

## 2023-10-20 RX ORDER — ACETAMINOPHEN 325 MG/1
650 TABLET ORAL ONCE
Status: CANCELLED | OUTPATIENT
Start: 2023-10-20

## 2023-10-20 NOTE — PROGRESS NOTES
11:00-11:15 Pt arrived to ACU. Nurse in room and asked pt if she was feeling any better or having increased energy. Pt states she has not felt much better and states she ha a positive pregnancy test last night and that might be why she is still lethargic. States she is a week past her normal menstrual cycle. Nurse placed call to ordering NP to report this and contraindication of Venofer in first trimester. Waiting for return call.     11:50- No return call. pt states she is going to go ahead and leave and asked to leave a message on her voicemail once we hear from NP.    1340- NP says to not give last dose of Venofer and to have pt contact PCP and inform her of positive test and that she has received 4 doses in the past 2 weeks.    1345-message left on voicemail per pt request.

## 2023-10-20 NOTE — TELEPHONE ENCOUNTER
Gema from ACU called to speak with Margo Martin because this patient was scheduled to have Venofer infusions today but she tested positive on a pregnancy test yesterday. Venofer is contraindicated on the first trimester, Margo cancelled her infusions today and patient needs to notify PCP about pregnancy and that she's been having Venofer infusions for the last 2 weeks.

## 2023-10-25 ENCOUNTER — APPOINTMENT (OUTPATIENT)
Dept: ULTRASOUND IMAGING | Facility: HOSPITAL | Age: 43
End: 2023-10-25
Payer: MEDICAID

## 2023-10-25 ENCOUNTER — HOSPITAL ENCOUNTER (EMERGENCY)
Facility: HOSPITAL | Age: 43
Discharge: HOME | End: 2023-10-25
Attending: EMERGENCY MEDICINE
Payer: MEDICAID

## 2023-10-25 VITALS
DIASTOLIC BLOOD PRESSURE: 74 MMHG | HEART RATE: 66 BPM | BODY MASS INDEX: 33.66 KG/M2 | SYSTOLIC BLOOD PRESSURE: 104 MMHG | OXYGEN SATURATION: 100 % | TEMPERATURE: 97.9 F | RESPIRATION RATE: 18 BRPM | WEIGHT: 190 LBS | HEIGHT: 63 IN

## 2023-10-25 DIAGNOSIS — O20.9 VAGINAL BLEEDING AFFECTING EARLY PREGNANCY: Primary | ICD-10-CM

## 2023-10-25 DIAGNOSIS — O20.0 THREATENED MISCARRIAGE IN EARLY PREGNANCY: ICD-10-CM

## 2023-10-25 DIAGNOSIS — N83.201 RIGHT OVARIAN CYST: ICD-10-CM

## 2023-10-25 LAB
ABO GROUP BLD: NORMAL
ANION GAP SERPL CALCULATED.3IONS-SCNC: 8.4 MMOL/L (ref 5–15)
BACTERIA UR QL AUTO: ABNORMAL /HPF
BASOPHILS # BLD AUTO: 0.02 10*3/MM3 (ref 0–0.2)
BASOPHILS NFR BLD AUTO: 0.4 % (ref 0–1.5)
BILIRUB UR QL STRIP: NEGATIVE
BLD GP AB SCN SERPL QL: NEGATIVE
BUN SERPL-MCNC: 13 MG/DL (ref 6–20)
BUN/CREAT SERPL: 19.4 (ref 7–25)
CALCIUM SPEC-SCNC: 9 MG/DL (ref 8.6–10.5)
CHLORIDE SERPL-SCNC: 105 MMOL/L (ref 98–107)
CLARITY UR: ABNORMAL
CO2 SERPL-SCNC: 22.6 MMOL/L (ref 22–29)
COLOR UR: YELLOW
CREAT SERPL-MCNC: 0.67 MG/DL (ref 0.57–1)
DEPRECATED RDW RBC AUTO: 51.4 FL (ref 37–54)
EGFRCR SERPLBLD CKD-EPI 2021: 110.7 ML/MIN/1.73
EOSINOPHIL # BLD AUTO: 0.07 10*3/MM3 (ref 0–0.4)
EOSINOPHIL NFR BLD AUTO: 1.5 % (ref 0.3–6.2)
ERYTHROCYTE [DISTWIDTH] IN BLOOD BY AUTOMATED COUNT: 15.9 % (ref 12.3–15.4)
GLUCOSE SERPL-MCNC: 76 MG/DL (ref 65–99)
GLUCOSE UR STRIP-MCNC: NEGATIVE MG/DL
HCG INTACT+B SERPL-ACNC: 72.1 MIU/ML
HCT VFR BLD AUTO: 31.1 % (ref 34–46.6)
HGB BLD-MCNC: 9.9 G/DL (ref 12–15.9)
HGB UR QL STRIP.AUTO: ABNORMAL
HYALINE CASTS UR QL AUTO: ABNORMAL /LPF
IMM GRANULOCYTES # BLD AUTO: 0.01 10*3/MM3 (ref 0–0.05)
IMM GRANULOCYTES NFR BLD AUTO: 0.2 % (ref 0–0.5)
KETONES UR QL STRIP: ABNORMAL
LEUKOCYTE ESTERASE UR QL STRIP.AUTO: NEGATIVE
LYMPHOCYTES # BLD AUTO: 1.78 10*3/MM3 (ref 0.7–3.1)
LYMPHOCYTES NFR BLD AUTO: 38.5 % (ref 19.6–45.3)
MCH RBC QN AUTO: 28.3 PG (ref 26.6–33)
MCHC RBC AUTO-ENTMCNC: 31.8 G/DL (ref 31.5–35.7)
MCV RBC AUTO: 88.9 FL (ref 79–97)
MONOCYTES # BLD AUTO: 0.35 10*3/MM3 (ref 0.1–0.9)
MONOCYTES NFR BLD AUTO: 7.6 % (ref 5–12)
NEUTROPHILS NFR BLD AUTO: 2.39 10*3/MM3 (ref 1.7–7)
NEUTROPHILS NFR BLD AUTO: 51.8 % (ref 42.7–76)
NITRITE UR QL STRIP: NEGATIVE
NRBC BLD AUTO-RTO: 0 /100 WBC (ref 0–0.2)
PH UR STRIP.AUTO: 5.5 [PH] (ref 5–8)
PLATELET # BLD AUTO: 188 10*3/MM3 (ref 140–450)
PMV BLD AUTO: 11.7 FL (ref 6–12)
POTASSIUM SERPL-SCNC: 3.4 MMOL/L (ref 3.5–5.2)
PROT UR QL STRIP: ABNORMAL
RBC # BLD AUTO: 3.5 10*6/MM3 (ref 3.77–5.28)
RBC # UR STRIP: ABNORMAL /HPF
REF LAB TEST METHOD: ABNORMAL
RH BLD: POSITIVE
SODIUM SERPL-SCNC: 136 MMOL/L (ref 136–145)
SP GR UR STRIP: 1.03 (ref 1–1.03)
SQUAMOUS #/AREA URNS HPF: ABNORMAL /HPF
T&S EXPIRATION DATE: NORMAL
UROBILINOGEN UR QL STRIP: ABNORMAL
WBC # UR STRIP: ABNORMAL /HPF
WBC NRBC COR # BLD: 4.62 10*3/MM3 (ref 3.4–10.8)

## 2023-10-25 PROCEDURE — 84702 CHORIONIC GONADOTROPIN TEST: CPT | Performed by: EMERGENCY MEDICINE

## 2023-10-25 PROCEDURE — 99284 EMERGENCY DEPT VISIT MOD MDM: CPT

## 2023-10-25 PROCEDURE — 80048 BASIC METABOLIC PNL TOTAL CA: CPT | Performed by: EMERGENCY MEDICINE

## 2023-10-25 PROCEDURE — 86900 BLOOD TYPING SEROLOGIC ABO: CPT | Performed by: EMERGENCY MEDICINE

## 2023-10-25 PROCEDURE — 93976 VASCULAR STUDY: CPT

## 2023-10-25 PROCEDURE — 81001 URINALYSIS AUTO W/SCOPE: CPT | Performed by: EMERGENCY MEDICINE

## 2023-10-25 PROCEDURE — 86850 RBC ANTIBODY SCREEN: CPT | Performed by: EMERGENCY MEDICINE

## 2023-10-25 PROCEDURE — 85025 COMPLETE CBC W/AUTO DIFF WBC: CPT | Performed by: EMERGENCY MEDICINE

## 2023-10-25 PROCEDURE — 76817 TRANSVAGINAL US OBSTETRIC: CPT

## 2023-10-25 PROCEDURE — 86901 BLOOD TYPING SEROLOGIC RH(D): CPT | Performed by: EMERGENCY MEDICINE

## 2023-10-25 PROCEDURE — 76815 OB US LIMITED FETUS(S): CPT

## 2023-10-25 RX ORDER — SODIUM CHLORIDE 0.9 % (FLUSH) 0.9 %
10 SYRINGE (ML) INJECTION AS NEEDED
Status: DISCONTINUED | OUTPATIENT
Start: 2023-10-25 | End: 2023-10-25 | Stop reason: HOSPADM

## 2023-10-25 NOTE — DISCHARGE INSTRUCTIONS
Follow-up with Dr. Sorenson tomorrow as scheduled.  You will need to have your hCG level rechecked..  You will also need a follow-up ultrasound.  Return to the emergency department for worsening pain, bleeding more than 1 pad per hour, dizziness, fainting, or other concern.

## 2023-10-25 NOTE — ED PROVIDER NOTES
EMERGENCY DEPARTMENT ENCOUNTER    Room Number:  40/40  PCP: Provider, No Known  Historian: Patient      HPI:  Chief Complaint: Pregnant, vaginal bleeding  A complete HPI/ROS/PMH/PSH/SH/FH are unobtainable due to: Nothing  Context: Sravanthi Kauffman is a 43 y.o. [id number] who presents to the ED c/o vaginal bleeding.  Patient's LMP was 14.  She had a positive home pregnancy test several days ago.  She reports mild vaginal bleeding/spotting for the past 4 to 5 days.  Denies abdominal pain/cramping, dizziness, lightheadedness, syncope, fever, chills, or dysuria.  She also reports intermittent nausea and vomiting for the past week or so.  She has her first appointment with Dr. Sorenson, OB/GYN, tomorrow.            PAST MEDICAL HISTORY  Active Ambulatory Problems     Diagnosis Date Noted    Constipation 2021    Shoulder pain 2021    Hip pain 2021    Back pain 2021    Carpal tunnel syndrome 2021    Anemia 2021    History of Divya-en-Y gastric bypass 2021    Numbness and tingling 2022    APC (atrial premature contractions) 2022    Dietary counseling 2022    Hypocalcemia 2023    Chronic fatigue 2023    Obesity, Class I, BMI 30-34.9 2023    Iron deficiency anemia following bariatric surgery 2023     Resolved Ambulatory Problems     Diagnosis Date Noted    Numbness and tingling 2021    Obesity, Class III, BMI 40-49.9 (morbid obesity) 2021     Past Medical History:   Diagnosis Date    Anxiety     History of heart murmur in childhood     History of miscarriage     Obesity     Osteoarthritis of left knee     Sleep difficulties     SOB (shortness of breath) on exertion          PAST SURGICAL HISTORY  Past Surgical History:   Procedure Laterality Date     SECTION  1998    ENDOSCOPY N/A 2022    Procedure: ESOPHAGOGASTRODUODENOSCOPY WITH BIOPSY;  Surgeon: Vinicio Arango Jr., MD;  Location: [facility] ENDOSCOPY;   Service: General;  Laterality: N/A;  pre: dyspepsia, hx of gastric sleeve   post: gastritis     GASTRIC BYPASS N/A 9/12/2022    Procedure: GASTRIC BYPASS LAPAROSCOPIC conversion from sleeve,PARTIAL GASTRECTOMY;  Surgeon: Vinicio Arango Jr., MD;  Location: [facility] OR [facility];  Service: Bariatric;  Laterality: N/A;    GASTRIC SLEEVE LAPAROSCOPIC  04/14/2012         FAMILY HISTORY  Family History   Problem Relation Age of Onset    Chronic fatigue Mother     Cancer Mother     Obesity Mother     Hypertension Mother     Hyperlipidemia Father     Obesity Father     Hypertension Father     No Known Problems Sister     Obesity Brother     Sleep apnea Brother     Sleep apnea Brother     Obesity Brother     No Known Problems Brother     No Known Problems Son     Obesity Maternal Grandmother     Stroke Maternal Grandmother     Cancer Maternal Grandfather     Prostate cancer Maternal Grandfather     No Known Problems Paternal Grandmother     No Known Problems Paternal Grandfather     Malig Hyperthermia Neg Hx          SOCIAL HISTORY  Social History     Socioeconomic History    Marital status: Single   Tobacco Use    Smoking status: Never    Smokeless tobacco: Never   Vaping Use    Vaping Use: Never used   Substance and Sexual Activity    Alcohol use: Yes     Comment: rare/caffeine use    Drug use: Never    Sexual activity: Defer         ALLERGIES  Latex    REVIEW OF SYSTEMS  All systems have been reviewed and are negative except for those listed in the HPI      PHYSICAL EXAM  ED Triage Vitals [10/25/23 0821]   Temp Heart Rate Resp BP SpO2   97.9 °F (36.6 °C) 81 -- -- 99 %      Temp src Heart Rate Source Patient Position BP Location FiO2 (%)   Tympanic Monitor -- -- --       Physical Exam      GENERAL: Awake, alert, oriented x3.  Well-developed, well-nourished female.  Resting comfortably in no acute distress  HENT: NCAT, nares patent  EYES: no scleral icterus  CV: regular rhythm, normal rate  RESPIRATORY: normal effort, clear to  auscultation bilaterally  ABDOMEN: soft, nondistended, nontender, no CVA tenderness  MUSCULOSKELETAL: Extremities are nontender with full range of motion  NEURO: Speech is normal.  No facial droop.  Follows commands  PSYCH:  calm, cooperative  SKIN: warm, dry    Vital signs and nursing notes reviewed.          LAB RESULTS  Recent Results (from the past 24 hour(s))   Basic Metabolic Panel    Collection Time: 10/25/23  8:35 AM    Specimen: Blood   Result Value Ref Range    Glucose 76 65 - 99 mg/dL    BUN 13 6 - 20 mg/dL    Creatinine 0.67 0.57 - 1.00 mg/dL    Sodium 136 136 - 145 mmol/L    Potassium 3.4 (L) 3.5 - 5.2 mmol/L    Chloride 105 98 - 107 mmol/L    CO2 22.6 22.0 - 29.0 mmol/L    Calcium 9.0 8.6 - 10.5 mg/dL    BUN/Creatinine Ratio 19.4 7.0 - 25.0    Anion Gap 8.4 5.0 - 15.0 mmol/L    eGFR 110.7 >60.0 mL/min/1.73   hCG, Quantitative, Pregnancy    Collection Time: 10/25/23  8:35 AM    Specimen: Blood   Result Value Ref Range    HCG Quantitative 72.10 mIU/mL   Type & Screen    Collection Time: 10/25/23  8:35 AM    Specimen: Blood   Result Value Ref Range    ABO Type O     RH type Positive     Antibody Screen Negative     T&S Expiration Date 10/28/2023 11:59:59 PM    CBC Auto Differential    Collection Time: 10/25/23  8:35 AM    Specimen: Blood   Result Value Ref Range    WBC 4.62 3.40 - 10.80 10*3/mm3    RBC 3.50 (L) 3.77 - 5.28 10*6/mm3    Hemoglobin 9.9 (L) 12.0 - 15.9 g/dL    Hematocrit 31.1 (L) 34.0 - 46.6 %    MCV 88.9 79.0 - 97.0 fL    MCH 28.3 26.6 - 33.0 pg    MCHC 31.8 31.5 - 35.7 g/dL    RDW 15.9 (H) 12.3 - 15.4 %    RDW-SD 51.4 37.0 - 54.0 fl    MPV 11.7 6.0 - 12.0 fL    Platelets 188 140 - 450 10*3/mm3    Neutrophil % 51.8 42.7 - 76.0 %    Lymphocyte % 38.5 19.6 - 45.3 %    Monocyte % 7.6 5.0 - 12.0 %    Eosinophil % 1.5 0.3 - 6.2 %    Basophil % 0.4 0.0 - 1.5 %    Immature Grans % 0.2 0.0 - 0.5 %    Neutrophils, Absolute 2.39 1.70 - 7.00 10*3/mm3    Lymphocytes, Absolute 1.78 0.70 - 3.10 10*3/mm3     Monocytes, Absolute 0.35 0.10 - 0.90 10*3/mm3    Eosinophils, Absolute 0.07 0.00 - 0.40 10*3/mm3    Basophils, Absolute 0.02 0.00 - 0.20 10*3/mm3    Immature Grans, Absolute 0.01 0.00 - 0.05 10*3/mm3    nRBC 0.0 0.0 - 0.2 /100 WBC   Urinalysis With Microscopic If Indicated (No Culture) - Urine, Clean Catch    Collection Time: 10/25/23 10:52 AM    Specimen: Urine, Clean Catch   Result Value Ref Range    Color, UA Yellow Yellow, Straw    Appearance, UA Hazy (A) Clear    pH, UA 5.5 5.0 - 8.0    Specific Gravity, UA 1.026 1.005 - 1.030    Glucose, UA Negative Negative    Ketones, UA Trace (A) Negative    Bilirubin, UA Negative Negative    Blood, UA Large (3+) (A) Negative    Protein, UA Trace (A) Negative    Leuk Esterase, UA Negative Negative    Nitrite, UA Negative Negative    Urobilinogen, UA 0.2 E.U./dL 0.2 - 1.0 E.U./dL   Urinalysis, Microscopic Only - Urine, Clean Catch    Collection Time: 10/25/23 10:52 AM    Specimen: Urine, Clean Catch   Result Value Ref Range    RBC, UA 3-5 (A) None Seen, 0-2 /HPF    WBC, UA 0-2 None Seen, 0-2 /HPF    Bacteria, UA Trace (A) None Seen /HPF    Squamous Epithelial Cells, UA 3-6 (A) None Seen, 0-2 /HPF    Hyaline Casts, UA None Seen None Seen /LPF    Methodology Manual Light Microscopy        Ordered the above labs and reviewed the results.        RADIOLOGY  US Ob Limited 1 + Fetuses, US Ob Transvaginal, US Testicular or Ovarian Vascular Limited    Result Date: 10/25/2023  Examination: Pelvic sonogram-transabdominal and transvaginal-early OB  Pelvic Doppler  TECHNIQUE: Gray scale, color Doppler, and spectral Doppler images of the pelvis were obtained from the transabdominal and transvaginal approaches to evaluate for fetal presence, not anatomy  HISTORY: Pregnancy and vaginal bleeding. Beta-hCG of 72  COMPARISON: None available  FINDINGS: The uterus measures approximately 10 x 5 x 5.5 cm. No intrauterine gestational sac is seen. Nabothian cysts are seen in the cervix. The  ovaries are normal in appearance, with follicles and Doppler flow, the right measuring 2.5 x 3.6 x 2 cm, and the left measuring 2.3 x 2.1 x 1.5 cm. A right adnexal cyst measures 2.1 cm. No free pelvic fluid is seen. There is a likely subserosal uterine fibroid measuring 1.7 x 1.6 x 1.5 cm.      1. Likely subserosal uterine fibroid  2. Right adnexal cyst, simple appearing.  3. No intrauterine pregnancy seen. This is technically pregnancy of unknown location. Correlate with serial beta-hCG levels and repeat study when levels become appropriate for visualization.  This report was finalized on 10/25/2023 11:38 AM by Dr. Mauro Vidales M.D on Workstation: [employer]       Ordered the above noted radiological studies. Reviewed by me in PACS.            PROCEDURES  Procedures              MEDICATIONS GIVEN IN ER  Medications   sodium chloride 0.9 % flush 10 mL (has no administration in time range)                   MEDICAL DECISION MAKING, PROGRESS, and CONSULTS    All labs have been independently reviewed by me.  All radiology studies have been reviewed by me and I have also reviewed the radiology report.   EKG's independently viewed and interpreted by me.  Discussion below represents my analysis of pertinent findings related to patient's condition, differential diagnosis, treatment plan and final disposition.      Additional sources:  - Discussed/ obtained information from independent historians: None    - External (non-ED) record review: Patient had a telemedicine visit with her PCP in August 2023 for URI.  Her hemoglobin was 9.4 on 9/25/2023.    - Chronic or social conditions impacting care: None          Orders placed during this visit:  Orders Placed This Encounter   Procedures    US Ob Limited 1 + Fetuses    US Ob Transvaginal    US Testicular or Ovarian Vascular Limited    Basic Metabolic Panel    hCG, Quantitative, Pregnancy    CBC Auto Differential    Urinalysis With Microscopic If Indicated (No Culture) -  Urine, Clean Catch    Urinalysis, Microscopic Only - Urine, Clean Catch    Monitor Blood Pressure    Type & Screen    Insert Peripheral IV    CBC & Differential         Additional orders considered but not ordered:  None        Differential diagnosis:    Threatened , early pregnancy, ectopic pregnancy, vaginal bleeding during pregnancy, UTI, STD      Independent interpretation of labs, radiology studies, and discussions with consultants:  ED Course as of 10/25/23 1211   Wed Oct 25, 2023   0915 HCG Quantitative: 72.10 [WH]   0957 WBC: 4.62 [WH]   0957 Hemoglobin(!): 9.9  Stable [WH]   0957 RH type: Positive [WH]   0957 Antibody Screen: Negative [WH]   0957 ABO Type: O [WH]   1147 Per the radiologist, pelvic ultrasound shows a likely subserosal uterine fibroid.  There is a simple appearing right adnexal cyst.  No IUP is seen.  No free pelvic fluid. [WH]   1204 Test results discussed with the patient.  She is resting comfortably.  She has her first appointment with her obstetrician tomorrow.  I discussed with her the importance of having a repeat hCG and ultrasound..  Return precautions were discussed. [WH]   1209 MDM: Patient presented to the ED complaining of vaginal spotting.  She recently had a positive home pregnancy test.  LMP was on .  She denies abdominal pain.  Abdominal exam was benign.  Quantitative hCG was 72.  Pelvic ultrasound did not show any evidence of an intrauterine or extrauterine pregnancy.  Patient has chronic anemia but hemoglobin was stable.  Labs are otherwise unremarkable.  She was hemodynamically stable in the ED.  She has her first appointment with Dr. Sorenson, her OB, tomorrow.  She will need short-term repeat hCG and ultrasound.  Bleeding certainly could be due to a threatened miscarriage.  Patient has had several miscarriages in the past. [WH]      ED Course User Index  [WH] Samir Reddy MD               DIAGNOSIS  Final diagnoses:   Vaginal bleeding affecting early  pregnancy   Right ovarian cyst   Threatened miscarriage in early pregnancy         DISPOSITION  DISCHARGE    Patient discharged in stable condition.    Reviewed implications of results, diagnosis, meds, responsibility to follow up, warning signs and symptoms of possible worsening, potential complications and reasons to return to ER, including worsening pain, bleeding more than 1 pad of blood per hour, dizziness, fainting, or other concern.    Patient/Family voiced understanding of above instructions.    Discussed plan for discharge, as there is no emergent indication for admission. Patient referred to primary care provider for BP management due to today's BP. Pt/family is agreeable and understands need for follow up and repeat testing.  Pt is aware that discharge does not mean that nothing is wrong but it indicates no emergency is present that requires admission and they must continue care with follow-up as given below or physician of their choice.     FOLLOW-UP  Teodoro Sorenson MD  [address] [address]  [phone]    Go in 1 day  As scheduled         Medication List      No changes were made to your prescriptions during this visit.                   Latest Documented Vital Signs:  As of 12:11 EDT  BP- 104/74 HR- 66 Temp- 97.9 °F (36.6 °C) (Tympanic) O2 sat- 100%              --    Please note that portions of this were completed with a voice recognition program.       Note Disclaimer: At [facility], we believe that sharing information builds trust and better relationships. You are receiving this note because you are receiving care at [facility] or recently visited. It is possible you will see health information before a provider has talked with you about it. This kind of information can be easy to misunderstand. To help you fully understand what it means for your health, we urge you to discuss this note with your provider.             Samir Reddy MD  10/25/23 7004

## 2023-10-26 ENCOUNTER — TELEPHONE (OUTPATIENT)
Dept: OBSTETRICS AND GYNECOLOGY | Facility: CLINIC | Age: 43
End: 2023-10-26

## 2023-10-26 DIAGNOSIS — O20.9 BLEEDING IN EARLY PREGNANCY: Primary | ICD-10-CM

## 2023-10-26 NOTE — TELEPHONE ENCOUNTER
Past Medical History:   Diagnosis Date    Anemia, unspecified     Cancer     Hypertension     Osteoporosis     PUD (peptic ulcer disease)     Type 2 diabetes mellitus without complications        Past Surgical History:   Procedure Laterality Date    CHOLECYSTECTOMY      colonsocopy      2017    ENDOSCOPIC ULTRASOUND OF UPPER GASTROINTESTINAL TRACT N/A 3/31/2023    Procedure: ULTRASOUND, UPPER GI TRACT, ENDOSCOPIC;  Surgeon: Kleber Bolaños MD;  Location: [facility] ENDO (2ND FLR);  Service: Endoscopy;  Laterality: N/A;  3/27 - precall attempted, no answer. SG    ERCP N/A 5/2/2023    Procedure: ERCP (ENDOSCOPIC RETROGRADE CHOLANGIOPANCREATOGRAPHY);  Surgeon: Jerry Vargas MD;  Location: [facility] ENDO (2ND FLR);  Service: Endoscopy;  Laterality: N/A;    HYSTERECTOMY  03/08/2023    INSERTION OF TUNNELED CENTRAL VENOUS CATHETER (CVC) WITH SUBCUTANEOUS PORT N/A 4/20/2023    Procedure: INSERTION, SINGLE LUMEN CATHETER WITH PORT, WITH FLUOROSCOPIC GUIDANCE;  Surgeon: Philip Anderson Jr., MD;  Location: [facility] 2ND FLR;  Service: General;  Laterality: N/A;    LAPAROSCOPIC CHOLECYSTECTOMY      LYMPH NODE BIOPSY Bilateral 03/08/2023    Procedure: BIOPSY, PELVIC LYMPH NODE;  Surgeon: Dallas Aguilar MD;  Location: [facility];  Service: OB/GYN;  Laterality: Bilateral;    ROBOTIC HYSTERECTOMY, WITH SALPINGO-OOPHORECTOMY Bilateral 03/08/2023    Procedure: ROBOTIC HYSTERECTOMY,WITH SALPINGO-OOPHORECTOMY;  Surgeon: Dallas Aguilar MD;  Location: [facility];  Service: OB/GYN;  Laterality: Bilateral;    TOTAL KNEE ARTHROPLASTY Left     2016    TOTAL KNEE ARTHROPLASTY Right     2019    VAGINAL DELIVERY      x 2  (one with epidural)       Review of patient's allergies indicates:  No Known Allergies    No current facility-administered medications on file prior to encounter.     Current Outpatient Medications on File Prior to Encounter   Medication Sig    aspirin (ECOTRIN) 81 MG EC tablet Take 1 tablet by mouth once daily.    atenoloL (TENORMIN) 50 MG  We can do 11/1/23 at 9 am.  "tablet Take 50 mg by mouth once daily. am    droNABinol (MARINOL) 2.5 MG capsule Take 1 capsule (2.5 mg total) by mouth 2 (two) times daily before meals.    esomeprazole (NEXIUM) 40 MG capsule esomeprazole magnesium 40 mg capsule,delayed release   TAKE 1 CAPSULE BY MOUTH EVERY DAY    famotidine (PEPCID) 20 MG tablet Take 20 mg by mouth 2 (two) times daily.    LIDOcaine-prilocaine (EMLA) cream Apply topically as needed.    losartan (COZAAR) 100 MG tablet Take 100 mg by mouth once daily.    metFORMIN (GLUCOPHAGE) 500 MG tablet Take 500 mg by mouth daily with breakfast.    morphine (MS CONTIN) 15 MG 12 hr tablet Take 1 tablet (15 mg total) by mouth nightly.    ondansetron (ZOFRAN-ODT) 8 MG TbDL Take 1 tablet (8 mg total) by mouth every 8 (eight) hours as needed (nausea).    pramipexole (MIRAPEX) 0.25 MG tablet Take 0.25 mg by mouth every evening.    promethazine (PHENERGAN) 25 MG tablet Take 1 tablet (25 mg total) by mouth every 6 (six) hours as needed for Nausea.    rosuvastatin (CRESTOR) 10 MG tablet Take 10 mg by mouth every evening.    alendronate (FOSAMAX) 35 MG tablet Take 35 mg by mouth every 7 days.    calcium carbonate-vitamin D3 (CALCIUM 600 WITH VITAMIN D3) 600 mg-10 mcg (400 unit) Chew Take 600 mg by mouth 2 (two) times a day.    insulin lispro 100 unit/mL injection Inject 6-10 Units into the skin 3 (three) times daily before meals.    naloxone (NARCAN) 4 mg/actuation Spry 4mg by nasal route as needed for opioid overdose; may repeat every 2-3 minutes in alternating nostrils until medical help arrives. Call 911    ONETOUCH DELICA LANCETS 33 gauge Misc Apply topically daily as needed.    ONETOUCH ULTRA TEST Strp TEST DAILY AND AS NEEDED    ONETOUCH ULTRA2 METER Misc TEST DAILY AND AS NEEDED    oxyCODONE (ROXICODONE) 5 MG immediate release tablet Take 1 tablet (5 mg total) by mouth every 6 (six) hours as needed for Pain.    pen needle, diabetic 31 gauge x 3/16" Ndle 30 each by Misc.(Non-Drug; Combo Route) " route every evening.     Family History       Problem Relation (Age of Onset)    Breast cancer Mother, Sister, Maternal Aunt    Colon cancer Mother, Maternal Aunt    Diabetes Mother    Hypertension Sister, Brother    Lung cancer Sister          Tobacco Use    Smoking status: Never     Passive exposure: Never    Smokeless tobacco: Never   Substance and Sexual Activity    Alcohol use: Not Currently    Drug use: Never    Sexual activity: Not Currently     Partners: Male     Review of Systems   Constitutional:  Positive for activity change, appetite change and fatigue. Negative for chills, fever and unexpected weight change.   HENT:  Negative for ear discharge and ear pain.    Eyes:  Negative for pain and discharge.   Respiratory:  Negative for cough and shortness of breath.    Cardiovascular:  Negative for chest pain and leg swelling.   Gastrointestinal:  Negative for nausea and vomiting.   Endocrine: Negative for cold intolerance and heat intolerance.   Genitourinary:  Negative for difficulty urinating and dysuria.   Musculoskeletal:  Positive for arthralgias. Negative for joint swelling and myalgias.   Skin:  Negative for rash and wound.   Neurological:  Negative for dizziness and headaches.   Psychiatric/Behavioral:  Negative for agitation and confusion.    Objective:     Vital Signs (Most Recent):  Temp: 98.1 °F (36.7 °C) (07/20/23 1154)  Pulse: 77 (07/20/23 1200)  Resp: 18 (07/20/23 1154)  BP: 135/76 (07/20/23 1154)  SpO2: 100 % (07/20/23 1154) Vital Signs (24h Range):  Temp:  [97.6 °F (36.4 °C)-98.4 °F (36.9 °C)] 98.1 °F (36.7 °C)  Pulse:  [69-92] 77  Resp:  [16-20] 18  SpO2:  [98 %-100 %] 100 %  BP: (117-144)/(56-78) 135/76     Weight: 56.6 kg (124 lb 12.5 oz)  Body mass index is 21.42 kg/m².     Physical Exam  Constitutional:       General: She is not in acute distress.  HENT:      Head: Normocephalic and atraumatic.   Eyes:      General:         Right eye: No discharge.         Left eye: No discharge.    Cardiovascular:      Rate and Rhythm: Normal rate and regular rhythm.   Pulmonary:      Effort: Pulmonary effort is normal.      Breath sounds: Normal breath sounds.   Abdominal:      General: There is no distension.      Tenderness: There is no abdominal tenderness.   Musculoskeletal:         General: No swelling or tenderness. Normal range of motion.      Cervical back: Neck supple. No tenderness.      Comments: Right hip pain     Skin:     General: Skin is warm and dry.   Neurological:      General: No focal deficit present.      Mental Status: She is alert and oriented to person, place, and time.      Comments: Intermittent moments of confusion but redirectable.     Psychiatric:         Mood and Affect: Mood normal.         Behavior: Behavior normal.              Significant Labs: A1C:   Recent Labs   Lab 03/21/23  0407 07/18/23  1207   HGBA1C 10.5* 5.8*       ABGs: No results for input(s): PH, PCO2, HCO3, POCSATURATED, BE, TOTALHB, COHB, METHB, O2HB, POCFIO2, PO2 in the last 48 hours.  Bilirubin:   Recent Labs   Lab 06/22/23  1112 07/05/23  1025 07/18/23  1207 07/19/23  0450 07/20/23  0644   BILITOT 1.2* 0.9 1.0 0.7 0.7       Blood Culture:   No results for input(s): LABBLOO in the last 48 hours.    BMP:   Recent Labs   Lab 07/20/23  0644   *      K 3.8   *   CO2 20*   BUN 4*   CREATININE 0.6   CALCIUM 8.8   MG 1.8       CBC:   Recent Labs   Lab 07/19/23  0450 07/20/23  0644   WBC 1.88* 1.53*   HGB 8.3* 8.2*   HCT 25.9* 25.5*   * 142*       CMP:   Recent Labs   Lab 07/19/23  0450 07/20/23  0644    139   K 2.9* 3.8   * 114*   CO2 18* 20*   GLU 74 115*   BUN 8 4*   CREATININE 0.7 0.6   CALCIUM 8.9 8.8   PROT 3.9* 3.8*   ALBUMIN 1.8* 1.7*   BILITOT 0.7 0.7   ALKPHOS 133 141*   * 152*   ALT 30 40   ANIONGAP 6* 5*       Cardiac Markers: No results for input(s): CKMB, MYOGLOBIN, BNP, TROPISTAT in the last 48 hours.  Coagulation: No results for input(s): PT, INR, APTT in  the last 48 hours.  Lactic Acid:   Recent Labs   Lab 07/18/23  1630 07/19/23  1051   LACTATE 2.1 2.5*       Lipase: No results for input(s): LIPASE in the last 48 hours.  Lipid Panel: No results for input(s): CHOL, HDL, LDLCALC, TRIG, CHOLHDL in the last 48 hours.  Magnesium:   Recent Labs   Lab 07/19/23  1051 07/20/23  0644   MG 1.5* 1.8       POCT Glucose:   Recent Labs   Lab 07/19/23  2033 07/20/23  0743 07/20/23  1128   POCTGLUCOSE 236* 114* 180*       Prealbumin: No results for input(s): PREALBUMIN in the last 48 hours.  Respiratory Culture: No results for input(s): GSRESP, RESPIRATORYC in the last 48 hours.  Troponin:   No results for input(s): TROPONINI, TROPONINIHS in the last 48 hours.    TSH:   Recent Labs   Lab 07/18/23  1207   TSH 8.789*       Urine Culture: No results for input(s): LABURIN in the last 48 hours.  Urine Studies:   No results for input(s): COLORU, APPEARANCEUA, PHUR, SPECGRAV, PROTEINUA, GLUCUA, KETONESU, BILIRUBINUA, OCCULTUA, NITRITE, UROBILINOGEN, LEUKOCYTESUR, RBCUA, WBCUA, BACTERIA, SQUAMEPITHEL, HYALINECASTS in the last 48 hours.    Invalid input(s): WRIGHTSUR      Significant Imaging: I have reviewed all pertinent imaging results/findings within the past 24 hours.

## 2023-10-27 ENCOUNTER — TELEPHONE (OUTPATIENT)
Dept: OBSTETRICS AND GYNECOLOGY | Facility: CLINIC | Age: 43
End: 2023-10-27
Payer: MEDICAID

## 2023-10-31 ENCOUNTER — LAB (OUTPATIENT)
Dept: LAB | Facility: HOSPITAL | Age: 43
End: 2023-10-31
Payer: MEDICAID

## 2023-10-31 DIAGNOSIS — O20.9 BLEEDING IN EARLY PREGNANCY: Primary | ICD-10-CM

## 2023-10-31 LAB — HCG INTACT+B SERPL-ACNC: 13.7 MIU/ML

## 2023-10-31 PROCEDURE — 84702 CHORIONIC GONADOTROPIN TEST: CPT

## 2023-10-31 PROCEDURE — 36415 COLL VENOUS BLD VENIPUNCTURE: CPT

## 2023-11-01 ENCOUNTER — OFFICE VISIT (OUTPATIENT)
Dept: OBSTETRICS AND GYNECOLOGY | Facility: CLINIC | Age: 43
End: 2023-11-01
Payer: MEDICAID

## 2023-11-01 VITALS
DIASTOLIC BLOOD PRESSURE: 80 MMHG | WEIGHT: 200.6 LBS | SYSTOLIC BLOOD PRESSURE: 122 MMHG | HEIGHT: 63 IN | BODY MASS INDEX: 35.54 KG/M2

## 2023-11-01 DIAGNOSIS — N96 HISTORY OF RECURRENT ABORTION, NOT CURRENTLY PREGNANT: ICD-10-CM

## 2023-11-01 DIAGNOSIS — O03.9 SPONTANEOUS ABORTION IN FIRST TRIMESTER: Primary | ICD-10-CM

## 2023-11-01 NOTE — PROGRESS NOTES
"        REASON FOR FOLLOWUP/CHIEF COMPLAINT: (Patient is here today for possible SAB. Quants have dropped. )      HISTORY OF PRESENT ILLNESS: Patient is 43-year-old now  5 para 10 4 1 with now her fourth very early spontaneous miscarriage.  She states that she had her first 1 in  and then ,  and now this current 1.  She was in the ER recently with bleeding and a positive home pregnancy test and noted to have a quantitative hCG of 72.  6 days later the hCG has dropped to 13.  I discussed with the patient the nature of first trimester losses and her additional risk factor now being maternal age that can elevate fetal loss rates up to 30 to 40%.  She would like to conceive and we discussed a plan of checking for increased risk of recurrent loss and we will check labs in 1 week along with a repeat quantitative hCG.      Past Medical History, Past Surgical History, Social History, Family History have been reviewed and are without significant changes except as mentioned.    Review of Systems   Review of Systems   Constitutional:  Negative for fever.   Genitourinary:  Positive for vaginal bleeding. Negative for pelvic pain.       Medications:  The current medication list was reviewed in the EMR    ALLERGIES:    Allergies   Allergen Reactions    Latex Itching              Vitals:    23 0851   BP: 122/80   Weight: 91 kg (200 lb 9.6 oz)   Height: 160 cm (62.99\")     Physical Exam    CONSTITUTIONAL:  Vital signs reviewed.  No distress, looks comfortable.  EYES:  Conjunctiva and lids unremarkable.  PERRLA  EARS,NOSE,MOUTH,THROAT:  Ears and nose appear unremarkable.  Lips, teeth, gums appear unremarkable.  RESPIRATORY:  Normal respiratory effort.  Lungs clear to auscultation bilaterally.  CARDIOVASCULAR:  Normal S1, S2.  No murmurs rubs or gallops.  No significant lower extremity edema.  GASTROINTESTINAL: Abdomen appears unremarkable.  Nontender.  No hepatomegaly.  No splenomegaly.  NEURO: cranial nerves " 2-12 grossly intact.  No focal deficits.  Appears to have equal strength all 4 extremities.  MUSCULOSKELETAL:  Unremarkable digits/nails.  No cyanosis or clubbing.  SKIN:  Warm.  No rashes.  PSYCHIATRIC:  Normal judgment and insight.  Normal mood and affect.       RECENT LABS:  WBC   Date Value Ref Range Status   10/25/2023 4.62 3.40 - 10.80 10*3/mm3 Final   09/25/2023 4.34 3.40 - 10.80 10*3/mm3 Final   09/13/2022 7.75 3.40 - 10.80 10*3/mm3 Final   09/09/2022 4.65 3.40 - 10.80 10*3/mm3 Final   04/22/2022 5.27 3.40 - 10.80 10*3/mm3 Final     Hemoglobin   Date Value Ref Range Status   10/25/2023 9.9 (L) 12.0 - 15.9 g/dL Final   09/25/2023 9.4 (L) 12.0 - 15.9 g/dL Final   09/13/2022 11.6 (L) 12.0 - 15.9 g/dL Final   09/09/2022 11.7 (L) 12.0 - 15.9 g/dL Final   04/22/2022 11.2 (L) 12.0 - 15.9 g/dL Final     Platelets   Date Value Ref Range Status   10/25/2023 188 140 - 450 10*3/mm3 Final   09/25/2023 207 140 - 450 10*3/mm3 Final   09/13/2022 155 140 - 450 10*3/mm3 Final   09/09/2022 154 140 - 450 10*3/mm3 Final   04/22/2022 221 140 - 450 10*3/mm3 Final       ASSESSMENT/PLAN:  Sravanthi Kauffman [unfilled]   1.  Spontaneous miscarriage, likely completed  2.  Recurrent pregnancy loss and advanced maternal age patient with desired pregnancy  3.  Incidental small 1 cm fibroid on recent ultrasound.  Plan: We will have her come back in 1 week and check quantitative hCG to assure that it is gone to 0 we will also check antiphospholipid antibody panel, TSH and hemoglobin A1c.  I will ask her to do home ovulation tests and we will plan on empiric progesterone supplementation with lab monitoring of levels and start baby aspirin at 81 mg a day after she discusses with her gastric surgeons following gastric sleeve surgery.  I also asked her to stay on a prenatal vitamin while she is trying to conceive.  I did tell her that further work-up beyond the above plan would need to be managed by fertility specialists at her age.

## 2023-11-20 ENCOUNTER — LAB (OUTPATIENT)
Dept: LAB | Facility: HOSPITAL | Age: 43
End: 2023-11-20
Payer: MEDICAID

## 2023-11-20 DIAGNOSIS — N96 HISTORY OF RECURRENT ABORTION, NOT CURRENTLY PREGNANT: Primary | ICD-10-CM

## 2023-11-20 LAB — HCG INTACT+B SERPL-ACNC: <1 MIU/ML

## 2023-11-20 PROCEDURE — 86147 CARDIOLIPIN ANTIBODY EA IG: CPT | Performed by: OBSTETRICS & GYNECOLOGY

## 2023-11-20 PROCEDURE — 85732 THROMBOPLASTIN TIME PARTIAL: CPT | Performed by: OBSTETRICS & GYNECOLOGY

## 2023-11-20 PROCEDURE — 86146 BETA-2 GLYCOPROTEIN ANTIBODY: CPT | Performed by: OBSTETRICS & GYNECOLOGY

## 2023-11-20 PROCEDURE — 85598 HEXAGNAL PHOSPH PLTLT NEUTRL: CPT | Performed by: OBSTETRICS & GYNECOLOGY

## 2023-11-20 PROCEDURE — 83036 HEMOGLOBIN GLYCOSYLATED A1C: CPT | Performed by: OBSTETRICS & GYNECOLOGY

## 2023-11-20 PROCEDURE — 85610 PROTHROMBIN TIME: CPT | Performed by: OBSTETRICS & GYNECOLOGY

## 2023-11-20 PROCEDURE — 85670 THROMBIN TIME PLASMA: CPT | Performed by: OBSTETRICS & GYNECOLOGY

## 2023-11-20 PROCEDURE — 85730 THROMBOPLASTIN TIME PARTIAL: CPT | Performed by: OBSTETRICS & GYNECOLOGY

## 2023-11-20 PROCEDURE — 85613 RUSSELL VIPER VENOM DILUTED: CPT | Performed by: OBSTETRICS & GYNECOLOGY

## 2023-11-20 PROCEDURE — 84702 CHORIONIC GONADOTROPIN TEST: CPT

## 2023-11-20 PROCEDURE — 84443 ASSAY THYROID STIM HORMONE: CPT | Performed by: OBSTETRICS & GYNECOLOGY

## 2023-11-20 PROCEDURE — 85597 PHOSPHOLIPID PLTLT NEUTRALIZ: CPT | Performed by: OBSTETRICS & GYNECOLOGY

## 2023-12-07 ENCOUNTER — HOSPITAL ENCOUNTER (EMERGENCY)
Facility: HOSPITAL | Age: 43
Discharge: HOME | End: 2023-12-07
Attending: EMERGENCY MEDICINE
Payer: MEDICAID

## 2023-12-07 ENCOUNTER — APPOINTMENT (OUTPATIENT)
Dept: CT IMAGING | Facility: HOSPITAL | Age: 43
End: 2023-12-07
Payer: MEDICAID

## 2023-12-07 VITALS
BODY MASS INDEX: 35.44 KG/M2 | TEMPERATURE: 97.4 F | OXYGEN SATURATION: 100 % | SYSTOLIC BLOOD PRESSURE: 128 MMHG | HEART RATE: 56 BPM | RESPIRATION RATE: 16 BRPM | HEIGHT: 63 IN | DIASTOLIC BLOOD PRESSURE: 88 MMHG | WEIGHT: 200 LBS

## 2023-12-07 DIAGNOSIS — M54.16 LUMBAR RADICULOPATHY: Primary | ICD-10-CM

## 2023-12-07 PROCEDURE — 63710000001 PREDNISONE PER 1 MG: Performed by: EMERGENCY MEDICINE

## 2023-12-07 PROCEDURE — 72131 CT LUMBAR SPINE W/O DYE: CPT

## 2023-12-07 PROCEDURE — 99284 EMERGENCY DEPT VISIT MOD MDM: CPT

## 2023-12-07 RX ORDER — ONDANSETRON 4 MG/1
4 TABLET, FILM COATED ORAL EVERY 6 HOURS PRN
Qty: 10 TABLET | Refills: 0 | Status: SHIPPED | OUTPATIENT
Start: 2023-12-07

## 2023-12-07 RX ORDER — FAMOTIDINE 20 MG/1
20 TABLET, FILM COATED ORAL 2 TIMES DAILY
Qty: 20 TABLET | Refills: 0 | Status: SHIPPED | OUTPATIENT
Start: 2023-12-07

## 2023-12-07 RX ORDER — HYDROCODONE BITARTRATE AND ACETAMINOPHEN 7.5; 325 MG/1; MG/1
1 TABLET ORAL ONCE
Status: COMPLETED | OUTPATIENT
Start: 2023-12-07 | End: 2023-12-07

## 2023-12-07 RX ORDER — PREDNISONE 20 MG/1
60 TABLET ORAL ONCE
Status: COMPLETED | OUTPATIENT
Start: 2023-12-07 | End: 2023-12-07

## 2023-12-07 RX ORDER — HYDROCODONE BITARTRATE AND ACETAMINOPHEN 5; 325 MG/1; MG/1
1 TABLET ORAL 4 TIMES DAILY PRN
Qty: 10 TABLET | Refills: 0 | Status: SHIPPED | OUTPATIENT
Start: 2023-12-07

## 2023-12-07 RX ORDER — METHYLPREDNISOLONE 4 MG/1
TABLET ORAL
Qty: 21 EACH | Refills: 0 | Status: SHIPPED | OUTPATIENT
Start: 2023-12-07

## 2023-12-07 RX ADMIN — PREDNISONE 60 MG: 20 TABLET ORAL at 10:43

## 2023-12-07 RX ADMIN — HYDROCODONE BITARTRATE AND ACETAMINOPHEN 1 TABLET: 7.5; 325 TABLET ORAL at 10:44

## 2023-12-07 NOTE — ED NOTES
Pt c/o lower back pain that radiates down left leg and she it is painful to bear weight.  It has been worsening iver the last month

## 2023-12-07 NOTE — DISCHARGE INSTRUCTIONS
Go home and rest through the weekend.  Take medications as prescribed.  Call Dr. Del Angel for follow-up appointment or return if worse

## 2023-12-07 NOTE — Clinical Note
[facility] EMERGENCY DEPARTMENT  [address] [facility] [address]  Phone: [phone]    Sravanthi Kauffman was seen and treated in our emergency department on 12/7/2023.  She may return to work on 12/11/2023.         Thank you for choosing [facility].    Zuhair Luis MD

## 2023-12-07 NOTE — ED PROVIDER NOTES
EMERGENCY DEPARTMENT ENCOUNTER    Room Number:    Date seen:  2023  PCP: Provider, No Known  Historian: Patient and EMS      HPI:  Chief Complaint: Back pain  Context: Sravanthi Kauffman is a 43 y.o. female who presents to the ED EMS from home for progressively worsening back pain that radiates down her left leg for the past month.  She is now states it is painful to bear weight.  Patient states in the past she has been told she had sciatic nerve problems but has never been this bad.  Patient denies fevers, chills, saddle paresthesia, bowel or bladder incontinence or weakness.  Of note is the patient did unfortunately have a recent miscarriage and has had a history of gastric surgery.      PAST MEDICAL HISTORY  Active Ambulatory Problems     Diagnosis Date Noted    Constipation 2021    Shoulder pain 2021    Hip pain 2021    Back pain 2021    Carpal tunnel syndrome 2021    Anemia 2021    History of Divya-en-Y gastric bypass 2021    Numbness and tingling 2022    APC (atrial premature contractions) 2022    Dietary counseling 2022    Hypocalcemia 2023    Chronic fatigue 2023    Obesity, Class I, BMI 30-34.9 2023    Iron deficiency anemia following bariatric surgery 2023     Resolved Ambulatory Problems     Diagnosis Date Noted    Numbness and tingling 2021    Obesity, Class III, BMI 40-49.9 (morbid obesity) 2021     Past Medical History:   Diagnosis Date    Anxiety     History of heart murmur in childhood     History of miscarriage     Obesity     Osteoarthritis of left knee     Sleep difficulties     SOB (shortness of breath) on exertion          REVIEW OF SYSTEMS  All systems reviewed and negative except for those discussed in HPI.       PAST SURGICAL HISTORY  Past Surgical History:   Procedure Laterality Date     SECTION  1998    ENDOSCOPY N/A 2022    Procedure: ESOPHAGOGASTRODUODENOSCOPY WITH  BIOPSY;  Surgeon: Vinicio Arango Jr., MD;  Location: [facility] ENDOSCOPY;  Service: General;  Laterality: N/A;  pre: dyspepsia, hx of gastric sleeve   post: gastritis     GASTRIC BYPASS N/A 9/12/2022    Procedure: GASTRIC BYPASS LAPAROSCOPIC conversion from sleeve,PARTIAL GASTRECTOMY;  Surgeon: Vinicio Arango Jr., MD;  [place]  [facility] [place]  Service: Bariatric;  Laterality: N/A;    GASTRIC SLEEVE LAPAROSCOPIC  04/14/2012         FAMILY HISTORY  Family History   Problem Relation Age of Onset    Hyperlipidemia Father     Obesity Father     Hypertension Father     Chronic fatigue Mother     Cancer Mother     Obesity Mother     Hypertension Mother     Obesity Brother     Sleep apnea Brother     Sleep apnea Brother     Obesity Brother     No Known Problems Brother     No Known Problems Sister     No Known Problems Son     No Known Problems Paternal Grandfather     No Known Problems Paternal Grandmother     Obesity Maternal Grandmother     Stroke Maternal Grandmother     Cancer Maternal Grandfather     Prostate cancer Maternal Grandfather     Malig Hyperthermia Neg Hx     Breast cancer Neg Hx     Ovarian cancer Neg Hx     Uterine cancer Neg Hx     Colon cancer Neg Hx          SOCIAL HISTORY  Social History     Socioeconomic History    Marital status: Single   Tobacco Use    Smoking status: Never    Smokeless tobacco: Never   Vaping Use    Vaping Use: Never used   Substance and Sexual Activity    Alcohol use: Yes     Comment: rare/caffeine use    Drug use: Never    Sexual activity: Yes     Partners: Male     Birth control/protection: None         ALLERGIES  Latex      PHYSICAL EXAM  ED Triage Vitals [12/07/23 1015]   Temp Heart Rate Resp BP SpO2   97.4 °F (36.3 °C) 65 16 -- 100 %      Temp src Heart Rate Source Patient Position BP Location FiO2 (%)   Tympanic Monitor -- -- --       Physical Exam      GENERAL: 43-year-old female in mild distress  HENT: NCAT: nares patent: Neck supple  EYES: no scleral  icterus  ABDOMEN: soft, NTND: Bowel sounds positive  MUSCULOSKELETAL: no deformity: The patient does have left SI tenderness  NEURO: alert oriented x 3: The patient has a positive straight leg raise test on the left at 20 degrees and on the right at 45 degrees.  The patient has good dorsi and plantarflexion with 5/5 strength in bilateral lower extremities and sensation intact.  She has good pulses bilaterally.  PSYCH:  calm, cooperative  SKIN: warm, dry    Vital signs and nursing notes reviewed.      LAB RESULTS  No results found for this or any previous visit (from the past 24 hour(s)).    Ordered the above labs and reviewed the results.        RADIOLOGY  CT Lumbar Spine Without Contrast    Result Date: 12/7/2023  MRI LUMBAR SPINE WITHOUT CONTRAST  HISTORY: Back pain, left radiculopathy.  COMPARISON: None.  FINDINGS: Transitional anatomy is appreciated. For the purposes of this dictation the transitional vertebral body is being considered to be sacralization of L5 with hypoplastic ribs at T12.  There is moderate loss of disc height at L5-S1. Anterolisthesis of L4 upon L5 is appreciated, estimated to be approximately 2 mm.  Axial imaging demonstrates increased attenuation related to the lumen of the gallbladder, only partially visualized but most consistent with that of a 1.5 x 2.0 cm gallstone.  L1-L2: Mild facet degenerative disease is present bilaterally.  L2-L3: Mild facet degenerative disease is present bilaterally.  L3-L4: Moderate facet degenerative disease is present bilaterally.  L4-L5: Moderate to severe facet degenerative disease is present bilaterally. A broad-based disc-osteophyte complex is present which results in mild flattening of the ventral surface of the thecal sac. Mild foraminal stenosis is present bilaterally secondary to loss of disc height, anterolisthesis of L4 upon L5 and extension of a small disc osteophyte complex into the neural foramen. Mild if not mild to moderate lateral recess  narrowing is present bilaterally. This potentially may involve the traversing L5 nerve roots.  L5-S1: There is no evidence of disc bulge or herniation.      1.  For the purposes of this dictation the transitional vertebral body is being considered to be sacralization of L5. Careful correlation prior to any intervention is required given the presence of transitional anatomy. This could be considered the lumbarization of S1. 2.  There is no evidence of a focal herniation. Multilevel degenerative disease involving the lumbar spine is noted including facet degenerative disease at L4-L5 and to lesser extent L3-L4. Grade 1 anterolisthesis of L4 upon L5 is appreciated as well as a mild broad-based disc osteophyte complex. The posterior element degenerative disease, anterolisthesis and broad-based disc osteophyte complex contributes to mild if not mild to moderate lateral recess narrowing bilaterally. Clinical correlation suggested. Further evaluation could be performed with a MRI examination of the lumbar spine as indicated. 3.  There is partial visualization of an area of increased attenuation involving the lumen of the gallbladder consistent with a large gallstone. The visualized component measures approximately 1.5 x 2.0 cm in size.  Radiation dose reduction techniques were utilized, including automated exposure control and exposure modulation based on body size.        Ordered the above noted radiological studies. Reviewed by me in PACS.            PROCEDURES  Procedures          MEDICATIONS GIVEN IN ER  Medications   HYDROcodone-acetaminophen (NORCO) 7.5-325 MG per tablet 1 tablet (1 tablet Oral Given 12/7/23 1044)   predniSONE (DELTASONE) tablet 60 mg (60 mg Oral Given 12/7/23 1043)             MEDICAL DECISION MAKING, PROGRESS, and CONSULTS    All labs have been independently reviewed by me.  All radiology studies have been reviewed by me and I have also reviewed the radiology report.   EKG's independently viewed  and interpreted by me.  Discussion below represents my analysis of pertinent findings related to patient's condition, differential diagnosis, treatment plan and final disposition.      Additional sources:  - Discussed/ obtained information from independent historians: EMS states the patient was able to walk but was limping.    - Chronic or social conditions impacting care: She works in a job where she is frequently bending over and lifting heavy things    - Shared decision making: After shared decision-making discussion we agree she is stable for discharge and outpatient follow-up      Orders placed during this visit:  Orders Placed This Encounter   Procedures    CT Lumbar Spine Without Contrast    Ambulatory Referral to Internal Medicine         Differential diagnosis:  My differential diagnosis includes but is not limited to lumbar radiculopathy, cauda equina syndrome, epidural abscess, lumbar strain or sciatica      Independent interpretation of labs, radiology studies, and discussions with consultants:  ED Course as of 12/07/23 1256   Thu Dec 07, 2023   1039 I will treat the patient with Lortab, prednisone and we will CT her lumbar spine for further evaluation. [GP]   1247 Patient CT lumbar spine shows degenerative disc disease with mild disc bulge with decreased to the lateral recesses bilaterally. [GP]   1253 On repeat evaluation the patient feels much better.  I advised her of her lumbar radiculopathy and the need to follow-up as an outpatient.  I advised her we will place her on Medrol, short course of narcotics and some Pepcid and gave her careful return emergency room instructions.  She understands and agrees with the plan. [GP]      ED Course User Index  [GP] Zuhair Luis MD               DIAGNOSIS  Final diagnoses:   Lumbar radiculopathy         DISPOSITION  DISCHARGE    Patient discharged in stable condition.    Reviewed implications of results, diagnosis, meds, responsibility to follow up, warning  signs and symptoms of possible worsening, potential complications and reasons to return to ER, including worsening symptoms or fever.    Patient/Family voiced understanding of above instructions.    Discussed plan for discharge, as there is no emergent indication for admission.  Pt/family is agreeable and understands need for follow up and repeat testing.  Pt is aware that discharge does not mean that nothing is wrong but it indicates no emergency is present and they must continue care with follow-up as given below or physician of their choice.     FOLLOW-UP  PATIENT CONNECTION - [place] [address]  [phone]  Schedule an appointment as soon as possible for a visit       Ernesto Del Angel MD  [address]  [address]  [place] [address]  [phone]    Schedule an appointment as soon as possible for a visit                   Latest Documented Vital Signs:  As of 12:56 EST  BP- 125/93 HR- 56 Temp- 97.4 °F (36.3 °C) (Tympanic) O2 sat- 100%--      --------------------  Please note that portions of this were completed with a voice recognition program.       Note Disclaimer: At [facility], we believe that sharing information builds trust and better relationships. You are receiving this note because you are receiving care at [facility] or recently visited. It is possible you will see health information before a provider has talked with you about it. This kind of information can be easy to misunderstand. To help you fully understand what it means for your health, we urge you to discuss this note with your provider.             Zuhair Luis MD  12/07/23 1257

## 2023-12-08 ENCOUNTER — TELEPHONE (OUTPATIENT)
Dept: PEDIATRICS | Facility: OTHER | Age: 43
End: 2023-12-08
Payer: MEDICAID

## 2023-12-08 NOTE — TELEPHONE ENCOUNTER
Best call back number: [phone]    Who are you requesting to speak with (clinical staff, provider,  specific staff member): clinical staff      Reached out to patient in regards to a referral we had received to schedule patient with a pcp to follow up from the emergency department. Patient is already established with TRAIK Hallman in the office. She is requesting a call back from the office to see if she can switch her provider from Stephy to Dr. Duvall.    Please advise.

## 2023-12-08 NOTE — TELEPHONE ENCOUNTER
Called patient. Patient is scheduled for New patient appointment with Dr. Duvall 12/14/2023 at 11:15am.

## 2024-01-02 RX ORDER — TOPIRAMATE 50 MG/1
50 TABLET, FILM COATED ORAL DAILY
Qty: 90 TABLET | OUTPATIENT
Start: 2024-01-02

## 2024-01-29 ENCOUNTER — OFFICE VISIT (OUTPATIENT)
Dept: NEUROSURGERY | Facility: CLINIC | Age: 44
End: 2024-01-29
Payer: MEDICAID

## 2024-01-29 VITALS
HEART RATE: 62 BPM | WEIGHT: 196 LBS | OXYGEN SATURATION: 100 % | BODY MASS INDEX: 34.73 KG/M2 | DIASTOLIC BLOOD PRESSURE: 84 MMHG | HEIGHT: 63 IN | SYSTOLIC BLOOD PRESSURE: 120 MMHG

## 2024-01-29 DIAGNOSIS — R20.0 NUMBNESS AND TINGLING: Primary | ICD-10-CM

## 2024-01-29 DIAGNOSIS — M79.605 LEFT LEG PAIN: ICD-10-CM

## 2024-01-29 DIAGNOSIS — R20.2 NUMBNESS AND TINGLING: Primary | ICD-10-CM

## 2024-01-29 DIAGNOSIS — M54.16 LUMBAR RADICULOPATHY: ICD-10-CM

## 2024-01-29 PROCEDURE — 99213 OFFICE O/P EST LOW 20 MIN: CPT | Performed by: NURSE PRACTITIONER

## 2024-01-29 PROCEDURE — 1160F RVW MEDS BY RX/DR IN RCRD: CPT | Performed by: NURSE PRACTITIONER

## 2024-01-29 PROCEDURE — 1159F MED LIST DOCD IN RCRD: CPT | Performed by: NURSE PRACTITIONER

## 2024-01-29 RX ORDER — METHYLPREDNISOLONE 4 MG/1
TABLET ORAL
Qty: 21 TABLET | Refills: 0 | Status: SHIPPED | OUTPATIENT
Start: 2024-01-29

## 2024-01-29 NOTE — PROGRESS NOTES
Subjective   Patient ID: Sravanthi Kauffman is a 44 y.o. female is being seen for consultation today at the request of Zuhair Luis MD for Lumbar radiculopathy. Weakness and numbness on left side.     History of Present Illness    She went to the ER here at [facility] on December 7, 2023 for progressively worsening back pain with radiation down the left leg.  She reported the pain had been present for 1 month and it was difficult to bear weight.    Today, she reports that she has had low back and left leg pain off and on for the past couple of years, even before her gastric bypass surgery.  She thought that when she lost the weight the pain would go away.  Couple of months ago the pain came back and was more severe than it has ever been previously.  Now she has low back and left leg pain and almost any position.  It radiates medially down the thighs and into the calf.  She reports with any prolonged standing her left foot will go numb.  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  By the end of her shift she is having pretty severe left leg pain.  She has taken oral medications for the discomfort which provide only minimal relief, including Tylenol.  She is unable to take NSAIDs after her bypass surgery.    She denies any changes to her bowel or bladder function.  She denies any saddle paresthesias.  Her job requires lots of bending, twisting and lifting, upwards of 75 pounds.    She presents unaccompanied.    The following portions of the patient's history were reviewed and updated as appropriate: allergies, current medications, past family history, past medical history, past social history, past surgical history, and problem list.    Review of Systems   Constitutional:  Negative for chills and fever.   Respiratory:  Negative for cough and shortness of breath.    Cardiovascular:  Positive for leg swelling. Negative for chest pain and palpitations.   Gastrointestinal:  Negative for abdominal pain and  "constipation.   Genitourinary:  Negative for difficulty urinating and enuresis.   Musculoskeletal:  Positive for back pain and gait problem.   Skin:  Negative for rash.   Neurological:  Positive for weakness and numbness (or tingling).   Hematological:  Bruises/bleeds easily.   Psychiatric/Behavioral:  Negative for sleep disturbance.        /84   Pulse 62   Ht 160 cm (63\")   Wt 88.9 kg (196 lb)   SpO2 100%   BMI 34.72 kg/m²       Objective     Vitals:    01/29/24 1235   BP: 120/84   Pulse: 62   SpO2: 100%   Weight: 88.9 kg (196 lb)   Height: 160 cm (63\")     Body mass index is 34.72 kg/m².    Tobacco Use: Low Risk  (1/29/2024)    Patient History     Smoking Tobacco Use: Never     Smokeless Tobacco Use: Never     Passive Exposure: Not on file          Physical Exam  Vitals reviewed.   Constitutional:       Appearance: She is obese.   HENT:      Head: Atraumatic.   Pulmonary:      Effort: Pulmonary effort is normal.   Musculoskeletal:         General: No tenderness.      Comments: Strength equal and intact bilateral lower extremities  Nontender bilateral buttock and lumbar musculature   Skin:     General: Skin is warm and dry.   Neurological:      Mental Status: She is alert and oriented to person, place, and time.      GCS: GCS eye subscore is 4. GCS verbal subscore is 5. GCS motor subscore is 6.      Sensory: No sensory deficit.      Motor: No weakness or tremor.      Gait: Gait normal.      Deep Tendon Reflexes:      Reflex Scores:       Patellar reflexes are 1+ on the right side and 1+ on the left side.       Achilles reflexes are 1+ on the right side and 1+ on the left side.     Comments: Negative straight leg raise  Able to stand on heels and toes  Gait is stable and upright, but favoring the right leg  Normal sensation to soft touch   Psychiatric:         Mood and Affect: Mood normal.       Neurologic Exam     Mental Status   Oriented to person, place, and time.     Gait, Coordination, and Reflexes "     Reflexes   Right patellar: 1+  Left patellar: 1+  Right achilles: 1+  Left achilles: 1+          Assessment & Plan   Independent Review of Radiographic Studies:      I personally reviewed the images from the following studies.    CT lumbar spine without contrast from [facility] dated December 7, 2023 reveals stable findings from L1-2 L3-4.  At L4-5 there is moderate to severe facet degeneration bilaterally.  There is also a broad-based disc osteophyte complex which results in mild flattening of the ventral surface of the thecal sac.  There is mild foraminal stenosis present bilaterally due to loss of disc height.  There is anterolisthesis of L4 on L5 and extension of a small disc osteophyte complex into the neuroforamen.  Mild if not mild to moderate lateral recess narrowing is present bilaterally.  This may potentially involve the traversing L5 nerve roots.  L5-S1 shows stable findings.    Incidentally, there is increased attenuation in the lumen of the gallbladder consistent with a large gallstone.    Medical Decision Making:      She presents office today for further evaluation of acute on chronic low back and left leg pain.  Exam as noted above, no red flags.  Fortunately she is intact on exam with normal strength, sensation and reflexes.  She has had this pain off and on for the past couple of years.  CT of the lumbar spine was reviewed showing moderate to severe facet degeneration bilaterally at L4-5.  There is also a small disc osteophyte complex and anterolisthesis at this level.  I am concerned that a lot of her discomfort is related to inflammation in the lumbar spine.  I have ordered lumbar epidural injections.  Have also sent a Medrol Dosepak to her pharmacy.  We discussed NSAIDs, but she is unable to take them since undergoing gastric bypass.  She will continue to take Tylenol as needed.  If there is a light duty option at her work, I would be happy to give her a work note.  However, she does not think  there is an alternative position for her at this time.  She will talk to HR and let me know.  We will see her back in 2 months for follow-up.  If her symptoms change or worsen in the interim she is to let me know.    Plan: Referral to pain management for lumbar epidural injections, take Medrol Dosepak as directed, follow-up in 2 months        Diagnoses and all orders for this visit:    1. Numbness and tingling (Primary)  -     Epidural Block    2. Left leg pain  -     Epidural Block    3. Lumbar radiculopathy  -     Epidural Block    Other orders  -     methylPREDNISolone (MEDROL) 4 MG dose pack; Take as directed on package instructions.  Dispense: 21 tablet; Refill: 0      Return in about 2 months (around 3/29/2024).

## 2024-01-31 ENCOUNTER — PATIENT ROUNDING (BHMG ONLY) (OUTPATIENT)
Dept: NEUROSURGERY | Facility: CLINIC | Age: 44
End: 2024-01-31
Payer: MEDICAID

## 2024-02-23 ENCOUNTER — TELEPHONE (OUTPATIENT)
Dept: NEUROSURGERY | Facility: CLINIC | Age: 44
End: 2024-02-23
Payer: MEDICAID

## 2024-02-23 NOTE — TELEPHONE ENCOUNTER
Called patient to reschedule 4-2 appt with Melissa Dai. No answer, left vm.     OKAY FOR HUB TO SCHED

## 2024-03-20 ENCOUNTER — TELEPHONE (OUTPATIENT)
Dept: NEUROSURGERY | Facility: CLINIC | Age: 44
End: 2024-03-20
Payer: MEDICAID

## 2024-03-28 RX ORDER — TOPIRAMATE 50 MG/1
50 TABLET, FILM COATED ORAL DAILY
Qty: 30 TABLET | Refills: 0 | Status: SHIPPED | OUTPATIENT
Start: 2024-03-28

## 2024-03-30 ENCOUNTER — TELEMEDICINE (OUTPATIENT)
Dept: FAMILY MEDICINE CLINIC | Facility: TELEHEALTH | Age: 44
End: 2024-03-30
Payer: MEDICAID

## 2024-03-30 DIAGNOSIS — R39.89 SUSPECTED UTI: Primary | ICD-10-CM

## 2024-03-30 RX ORDER — NITROFURANTOIN 25; 75 MG/1; MG/1
100 CAPSULE ORAL 2 TIMES DAILY
Qty: 14 CAPSULE | Refills: 0 | Status: SHIPPED | OUTPATIENT
Start: 2024-03-30 | End: 2024-04-06

## 2024-03-30 RX ORDER — PHENAZOPYRIDINE HYDROCHLORIDE 200 MG/1
200 TABLET, FILM COATED ORAL 3 TIMES DAILY PRN
Qty: 6 TABLET | Refills: 0 | Status: SHIPPED | OUTPATIENT
Start: 2024-03-30 | End: 2024-04-01

## 2024-03-30 NOTE — PROGRESS NOTES
You have chosen to receive care through a telehealth visit.  Do you consent to use a video/audio connection for your medical care today? Yes     CHIEF COMPLAINT  No chief complaint on file.        HPI  Sravanthi Kauffman is a 44 y.o. female  presents with complaint of 4 day history of frequency, urgency, ache in bladder area after urination.  She denies fever/chills, n/v, back pain, hematuria, or vaginal symptoms at this time.  She has a history of UTIs.     Review of Systems  See HPI    Past Medical History:   Diagnosis Date    Anemia     Anxiety     Back pain     Constipation     History of heart murmur in childhood     History of miscarriage     Obesity     Osteoarthritis of left knee     Sleep difficulties     SOB (shortness of breath) on exertion        Family History   Problem Relation Age of Onset    Hyperlipidemia Father     Obesity Father     Hypertension Father     Chronic fatigue Mother     Cancer Mother     Obesity Mother     Hypertension Mother     Obesity Brother     Sleep apnea Brother     Sleep apnea Brother     Obesity Brother     No Known Problems Brother     No Known Problems Sister     No Known Problems Son     No Known Problems Paternal Grandfather     No Known Problems Paternal Grandmother     Obesity Maternal Grandmother     Stroke Maternal Grandmother     Cancer Maternal Grandfather     Prostate cancer Maternal Grandfather     Malig Hyperthermia Neg Hx     Breast cancer Neg Hx     Ovarian cancer Neg Hx     Uterine cancer Neg Hx     Colon cancer Neg Hx        Social History     Socioeconomic History    Marital status: Single   Tobacco Use    Smoking status: Never    Smokeless tobacco: Never   Vaping Use    Vaping status: Never Used   Substance and Sexual Activity    Alcohol use: Yes     Comment: rare/caffeine use    Drug use: Never    Sexual activity: Yes     Partners: Male     Birth control/protection: None       Sravanthi Kauffman  reports that she has never smoked. She has never used smokeless  tobacco.               There were no vitals taken for this visit.    PHYSICAL EXAM  Physical Exam   Constitutional: She is oriented to person, place, and time. She appears well-developed and well-nourished. She does not have a sickly appearance. She does not appear ill.   HENT:   Head: Normocephalic and atraumatic.   Pulmonary/Chest: Effort normal.  No respiratory distress.  Neurological: She is alert and oriented to person, place, and time.           Diagnoses and all orders for this visit:    1. Suspected UTI (Primary)  -     nitrofurantoin, macrocrystal-monohydrate, (MACROBID) 100 MG capsule; Take 1 capsule by mouth 2 (Two) Times a Day for 7 days.  Dispense: 14 capsule; Refill: 0  -     phenazopyridine (PYRIDIUM) 200 MG tablet; Take 1 tablet by mouth 3 (Three) Times a Day As Needed for Bladder Spasms for up to 2 days.  Dispense: 6 tablet; Refill: 0    --take medications as prescribed  --increase fluids, rest as needed, tylenol or ibuprofen for pain  --f/u in 2-3 days if no improvement        FOLLOW-UP  As discussed during visit with PCP/[facility] if no improvement or Urgent Care/Emergency Department if worsening of symptoms    Patient verbalizes understanding of medication dosage, comfort measures, instructions for treatment and follow-up.    Astird Gorman, TARIK  03/30/2024  11:20 EDT    The use of a video visit has been reviewed with the patient and verbal informed consent has been obtained. Myself and Sravanthi Kauffman participated in this visit. The patient is located in [address] [place] [address].    I am located in [place], [place]. Mychart and Twilio were utilized. I spent 8 minutes in the patient's chart for this visit.      Note Disclaimer: At [facility], we believe that sharing information builds trust and better   relationships. You are receiving this note because you recently visited [facility]. It is possible you   will see health information before a provider has talked with you  about it. This kind of information can   be easy to misunderstand. To help you fully understand what it means for your health, we urge you to   discuss this note with your provider.

## 2024-11-20 ENCOUNTER — TELEMEDICINE (OUTPATIENT)
Dept: FAMILY MEDICINE CLINIC | Facility: TELEHEALTH | Age: 44
End: 2024-11-20

## 2024-11-20 DIAGNOSIS — J06.9 UPPER RESPIRATORY TRACT INFECTION, UNSPECIFIED TYPE: Primary | ICD-10-CM

## 2024-11-20 PROCEDURE — 99213 OFFICE O/P EST LOW 20 MIN: CPT | Performed by: NURSE PRACTITIONER

## 2024-11-20 RX ORDER — DEXTROMETHORPHAN HYDROBROMIDE AND PROMETHAZINE HYDROCHLORIDE 15; 6.25 MG/5ML; MG/5ML
5 SYRUP ORAL NIGHTLY PRN
Qty: 120 ML | Refills: 0 | Status: SHIPPED | OUTPATIENT
Start: 2024-11-20

## 2024-11-20 RX ORDER — BROMPHENIRAMINE MALEATE, PSEUDOEPHEDRINE HYDROCHLORIDE, AND DEXTROMETHORPHAN HYDROBROMIDE 2; 30; 10 MG/5ML; MG/5ML; MG/5ML
5 SYRUP ORAL 4 TIMES DAILY PRN
Qty: 120 ML | Refills: 0 | Status: SHIPPED | OUTPATIENT
Start: 2024-11-20

## 2024-11-20 NOTE — PATIENT INSTRUCTIONS
Drink plenty of water  Over the counter pain relievers okay   If symptoms do not improve in 3-5 days follow up with your primary care provider or urgent care  If symptoms worsen follow up with urgent care or the emergency room      Upper Respiratory Infection, Adult  An upper respiratory infection (URI) is a common viral infection of the nose, throat, and upper air passages that lead to the lungs. The most common type of URI is the common cold. URIs usually get better on their own, without medical treatment.  What are the causes?  A URI is caused by a virus. You may catch a virus by:  Breathing in droplets from an infected person's cough or sneeze.  Touching something that has been exposed to the virus (is contaminated) and then touching your mouth, nose, or eyes.  What increases the risk?  You are more likely to get a URI if:  You are very young or very old.  You have close contact with others, such as at work, school, or a health care facility.  You smoke.  You have long-term (chronic) heart or lung disease.  You have a weakened disease-fighting system (immune system).  You have nasal allergies or asthma.  You are experiencing a lot of stress.  You have poor nutrition.  What are the signs or symptoms?  A URI usually involves some of the following symptoms:  Runny or stuffy (congested) nose.  Cough.  Sneezing.  Sore throat.  Headache.  Fatigue.  Fever.  Loss of appetite.  Pain in your forehead, behind your eyes, and over your cheekbones (sinus pain).  Muscle aches.  Redness or irritation of the eyes.  Pressure in the ears or face.  How is this diagnosed?  This condition may be diagnosed based on your medical history and symptoms, and a physical exam. Your health care provider may use a swab to take a mucus sample from your nose (nasal swab). This sample can be tested to determine what virus is causing the illness.  How is this treated?  URIs usually get better on their own within 7-10 days. Medicines cannot cure  URIs, but your health care provider may recommend certain medicines to help relieve symptoms, such as:  Over-the-counter cold medicines.  Cough suppressants. Coughing is a type of defense against infection that helps to clear the respiratory system, so take these medicines only as recommended by your health care provider.  Fever-reducing medicines.  Follow these instructions at home:  Activity  Rest as needed.  If you have a fever, stay home from work or school until your fever is gone or until your health care provider says your URI cannot spread to other people (is no longer contagious). Your health care provider may have you wear a face mask to prevent your infection from spreading.  Relieving symptoms  Gargle with a mixture of salt and water 3-4 times a day or as needed. To make salt water, completely dissolve ½-1 tsp (3-6 g) of salt in 1 cup (237 mL) of warm water.  Use a cool-mist humidifier to add moisture to the air. This can help you breathe more easily.  Eating and drinking    Drink enough fluid to keep your urine pale yellow.  Eat soups and other clear broths.  General instructions    Take over-the-counter and prescription medicines only as told by your health care provider. These include cold medicines, fever reducers, and cough suppressants.  Do not use any products that contain nicotine or tobacco. These products include cigarettes, chewing tobacco, and vaping devices, such as e-cigarettes. If you need help quitting, ask your health care provider.  Stay away from secondhand smoke.  Stay up to date on all immunizations, including the yearly (annual) flu vaccine.  Keep all follow-up visits. This is important.  How to prevent the spread of infection to others  URIs can be contagious. To prevent the infection from spreading:  Wash your hands with soap and water for at least 20 seconds. If soap and water are not available, use hand .  Avoid touching your mouth, face, eyes, or nose.  Cough or sneeze  into a tissue or your sleeve or elbow instead of into your hand or into the air.    Contact a health care provider if:  You are getting worse instead of better.  You have a fever or chills.  Your mucus is brown or red.  You have yellow or brown discharge coming from your nose.  You have pain in your face, especially when you bend forward.  You have swollen neck glands.  You have pain while swallowing.  You have white areas in the back of your throat.  Get help right away if:  You have shortness of breath that gets worse.  You have severe or persistent:  Headache.  Ear pain.  Sinus pain.  Chest pain.  You have chronic lung disease along with any of the following:  Making high-pitched whistling sounds when you breathe, most often when you breathe out (wheezing).  Prolonged cough (more than 14 days).  Coughing up blood.  A change in your usual mucus.  You have a stiff neck.  You have changes in your:  Vision.  Hearing.  Thinking.  Mood.  These symptoms may be an emergency. Get help right away. Call 911.  Do not wait to see if the symptoms will go away.  Do not drive yourself to the hospital.  Summary  An upper respiratory infection (URI) is a common infection of the nose, throat, and upper air passages that lead to the lungs.  A URI is caused by a virus.  URIs usually get better on their own within 7-10 days.  Medicines cannot cure URIs, but your health care provider may recommend certain medicines to help relieve symptoms.  This information is not intended to replace advice given to you by your health care provider. Make sure you discuss any questions you have with your health care provider.  Document Revised: 07/20/2022 Document Reviewed: 07/20/2022  [employer]vier Patient Education © 2024 [employer]

## 2024-11-20 NOTE — PROGRESS NOTES
CHIEF COMPLAINT  Chief Complaint   Patient presents with    Nasal Congestion         HPI  Sravanthi Kauffman is a 44 y.o. female  presents with complaint of nasal congestion and runny nose for a couple of weeks. She took Nyquil and it did not help. She is having a dry cough when she tried to sleep.     Review of Systems   Constitutional:  Positive for fatigue. Negative for chills, diaphoresis and fever.   HENT:  Positive for congestion, postnasal drip, rhinorrhea and sore throat. Negative for sinus pressure, sinus pain and sneezing.    Respiratory:  Positive for cough. Negative for chest tightness, shortness of breath and wheezing.    Cardiovascular:  Negative for chest pain.       Past Medical History:   Diagnosis Date    Anemia     Anxiety     Back pain     Constipation     History of heart murmur in childhood     History of miscarriage     Obesity     Osteoarthritis of left knee     Sleep difficulties     SOB (shortness of breath) on exertion        Family History   Problem Relation Age of Onset    Hyperlipidemia Father     Obesity Father     Hypertension Father     Chronic fatigue Mother     Cancer Mother     Obesity Mother     Hypertension Mother     Obesity Brother     Sleep apnea Brother     Sleep apnea Brother     Obesity Brother     No Known Problems Brother     No Known Problems Sister     No Known Problems Son     No Known Problems Paternal Grandfather     No Known Problems Paternal Grandmother     Obesity Maternal Grandmother     Stroke Maternal Grandmother     Cancer Maternal Grandfather     Prostate cancer Maternal Grandfather     Malig Hyperthermia Neg Hx     Breast cancer Neg Hx     Ovarian cancer Neg Hx     Uterine cancer Neg Hx     Colon cancer Neg Hx        Social History     Socioeconomic History    Marital status: Single   Tobacco Use    Smoking status: Never     Passive exposure: Never    Smokeless tobacco: Never   Vaping Use    Vaping status: Never Used   Substance and Sexual Activity    Alcohol  use: Yes     Comment: rare/caffeine use    Drug use: Never    Sexual activity: Yes     Partners: Male     Birth control/protection: None         LMP 10/26/2024 (Exact Date)   Breastfeeding No     PHYSICAL EXAM  Physical Exam   Constitutional: She is oriented to person, place, and time. She appears well-developed and well-nourished. She does not have a sickly appearance. She does not appear ill. No distress.   HENT:   Head: Normocephalic and atraumatic.   Nose: Rhinorrhea and congestion present.   Eyes: EOM are normal.   Neck: Neck normal appearance.  Pulmonary/Chest: Effort normal.  No respiratory distress.  Neurological: She is alert and oriented to person, place, and time.   Skin: Skin is dry.   Psychiatric: She has a normal mood and affect.       Diagnoses and all orders for this visit:    1. Upper respiratory tract infection, unspecified type (Primary)    Other orders  -     brompheniramine-pseudoephedrine-DM 30-2-10 MG/5ML syrup; Take 5 mL by mouth 4 (Four) Times a Day As Needed for Cough or Congestion.  Dispense: 120 mL; Refill: 0  -     promethazine-dextromethorphan (PROMETHAZINE-DM) 6.25-15 MG/5ML syrup; Take 5 mL by mouth At Night As Needed for Cough.  Dispense: 120 mL; Refill: 0        Mode of visit: Video   Myself and Sravanthi Kauffman participated in this visit. The patient is located in [address] [place] [address]. I am located in [place], [place]. [employer] and [employer] were utilized.   You have chosen to receive care through a telehealth visit.    You have chosen to receive care through a telehealth visit.   Does the patient consent to use a video/audio connection for your medical care today? Yes       Note Disclaimer: At [facility], we believe that sharing information builds trust and better   relationships. You are receiving this note because you recently visited [facility]. It is possible you   will see health information before a provider has talked with you about it. This kind  of information can   be easy to misunderstand. To help you fully understand what it means for your health, we urge you to   discuss this note with your provider.    Jess Neely, TARIK  11/20/2024  14:17 EST

## (undated) DEVICE — ECHELON FLEX POWERED PLUS LONG ARTICULATING ENDOSCOPIC LINEAR CUTTER, 60MM: Brand: ECHELON FLEX

## (undated) DEVICE — 500ML,PRESSURE INFUSER W/STOPCOCK: Brand: MEDLINE

## (undated) DEVICE — NDL HYPO PRECISIONGLIDE REG 20G 1 1/2

## (undated) DEVICE — HARMONIC ACE +7 LAPAROSCOPIC SHEARS ADVANCED HEMOSTASIS 5MM DIAMETER 45CM SHAFT LENGTH  FOR USE WITH GRAY HAND PIECE ONLY: Brand: HARMONIC ACE

## (undated) DEVICE — SENSR O2 OXIMAX FNGR A/ 18IN NONSTR

## (undated) DEVICE — UNIVERSAL PLUS LAPAROSCOPIC ELECTRODE WITH ESCHAR-RESISTANT COATING - SUCTION/IRRIGATION, L-HOOK 5 MM X 44 CM: Brand: UNIVERSAL PLUS

## (undated) DEVICE — ENDOPATH XCEL BLADELESS TROCARS WITH STABILITY SLEEVES: Brand: ENDOPATH XCEL

## (undated) DEVICE — TUBING, SUCTION, 1/4" X 10', STRAIGHT: Brand: MEDLINE

## (undated) DEVICE — CONN TBG Y 5 IN 1 LF STRL

## (undated) DEVICE — ENDOPATH XCEL UNIVERSAL TROCAR STABLILITY SLEEVES: Brand: ENDOPATH XCEL

## (undated) DEVICE — FRCP BX RADJAW4 NDL 2.8 240CM LG OG BX40

## (undated) DEVICE — SYR LUERLOK 20CC BX/50

## (undated) DEVICE — TROCAR: Brand: KII OPTICAL ACCESS SYSTEM

## (undated) DEVICE — Device: Brand: STANDARD BOUGIE, 38FR

## (undated) DEVICE — ADAPT CLN BIOGUARD AIR/H2O DISP

## (undated) DEVICE — MSK PROC CURAPLEX O2 2/ADAPT 7FT

## (undated) DEVICE — PK OSC LAP SLV 40

## (undated) DEVICE — LAPAROSCOPIC SMOKE FILTRATION SYSTEM: Brand: PALL LAPAROSHIELD® PLUS LAPAROSCOPIC SMOKE FILTRATION SYSTEM

## (undated) DEVICE — GLV SURG SENSICARE POLYISPRN W/ALOE PF LF 6.5 GRN STRL

## (undated) DEVICE — GLV SURG SENSICARE PI MIC PF SZ6 LF STRL

## (undated) DEVICE — VIOLET BRAIDED (POLYGLACTIN 910), SYNTHETIC ABSORBABLE SUTURE: Brand: COATED VICRYL

## (undated) DEVICE — BITEBLOCK OMNI BLOC

## (undated) DEVICE — LN SMPL CO2 SHTRM SD STREAM W/M LUER

## (undated) DEVICE — CONTAINER,SPECIMEN,OR STERILE,4OZ: Brand: MEDLINE

## (undated) DEVICE — GLV SURG SENSICARE PI MIC PF SZ8.5 LF STRL

## (undated) DEVICE — GLV SURG SENSICARE PI PF LF 8.5 GRN STRL

## (undated) DEVICE — VISUALIZATION SYSTEM: Brand: CLEARIFY

## (undated) DEVICE — APL DUPLOSPRAYER MIS 40CM

## (undated) DEVICE — SUT SILK 2/0 SH 30IN K833H

## (undated) DEVICE — DISPOSABLE MONOPOLAR ENDOSCOPIC CORD 10 FT. (3M): Brand: KIRWAN

## (undated) DEVICE — SUT VIC 2/0 SH 27IN

## (undated) DEVICE — DECANTER BAG 9": Brand: MEDLINE INDUSTRIES, INC.

## (undated) DEVICE — KT ORCA ORCAPOD DISP STRL